# Patient Record
Sex: FEMALE | Race: WHITE | NOT HISPANIC OR LATINO | Employment: FULL TIME | URBAN - NONMETROPOLITAN AREA
[De-identification: names, ages, dates, MRNs, and addresses within clinical notes are randomized per-mention and may not be internally consistent; named-entity substitution may affect disease eponyms.]

---

## 2020-11-19 LAB — POC BETA-HCG (QUAL): NEGATIVE

## 2021-03-23 LAB
BILIRUB SERPL-MCNC: NORMAL MG/DL
BLOOD URINE, POC: NORMAL
CLARITY, POC UA: CLEAR
COLOR, POC UA: YELLOW
GLUCOSE UR QL STRIP: NEGATIVE
KETONES UR QL STRIP: NEGATIVE
LEUKOCYTE EST, POC UA: NORMAL
NITRITE, POC UA: NEGATIVE
PH, POC UA: 6.5
POC BETA-HCG (QUAL): POSITIVE
PROTEIN, POC: NORMAL
SPECIFIC GRAVITY, POC UA: 1.02
UROBILINOGEN, POC UA: NORMAL

## 2021-03-30 LAB
CLARITY, POC UA: CLEAR
COLOR, POC UA: YELLOW
GLUCOSE UR QL STRIP: NEGATIVE
LEUKOCYTE EST, POC UA: NEGATIVE
NITRITE, POC UA: NEGATIVE
PROTEIN, POC: NEGATIVE

## 2021-04-05 ENCOUNTER — HISTORICAL (OUTPATIENT)
Dept: ADMINISTRATIVE | Facility: HOSPITAL | Age: 21
End: 2021-04-05

## 2021-04-05 LAB — B-HCG FREE SERPL-ACNC: 21.95 MIU/ML

## 2022-04-10 ENCOUNTER — HISTORICAL (OUTPATIENT)
Dept: ADMINISTRATIVE | Facility: HOSPITAL | Age: 22
End: 2022-04-10

## 2022-04-25 VITALS
SYSTOLIC BLOOD PRESSURE: 116 MMHG | HEIGHT: 63 IN | DIASTOLIC BLOOD PRESSURE: 60 MMHG | WEIGHT: 145.5 LBS | BODY MASS INDEX: 25.78 KG/M2 | OXYGEN SATURATION: 97 %

## 2022-04-26 LAB
ERYTHROCYTE [DISTWIDTH] IN BLOOD BY AUTOMATED COUNT: 11.5 % (ref 11–14.5)
HCT VFR BLD AUTO: 37.4 % (ref 36–48)
HGB BLD-MCNC: 13.1 G/DL (ref 11.8–16)
MCH RBC QN AUTO: 32.8 PG (ref 27–34)
MCHC RBC AUTO-ENTMCNC: 35 G/DL (ref 31–37)
MCV RBC AUTO: 93.5 FL (ref 79–99)
PLATELET # BLD AUTO: 257 10*3/UL (ref 140–371)
PMV BLD AUTO: 10.8 FL (ref 9.4–12.4)
RBC # BLD AUTO: 4 10*6/UL (ref 4–5.1)
WBC # SPEC AUTO: 9.5 10*3/UL (ref 4–11.5)

## 2022-05-02 ENCOUNTER — HISTORICAL (OUTPATIENT)
Dept: ADMINISTRATIVE | Facility: HOSPITAL | Age: 22
End: 2022-05-02

## 2022-05-03 NOTE — HISTORICAL [facility] CERNER
This is a historical note converted from Ernestine. Formatting and pictures may have been removed.  Please reference Ernestine for original formatting and attached multimedia. Chief Complaint  rescan  History of Present Illness  19yo WF  in today for Rescan. Reports to not have had any bleeding after Cytotec, just cramping.  Gynecological History  Last Menstrual Period: 20  Date of Last Pap Smear: 20  Menstrual Status Intake: Due to pregnancy  Age of Menarche: 10  STIs/STDs: No  Abnormal Pap: No  Current Birth Control Method: Implant  HPV Vaccine: No  Flow: Heavy  Dyspareunia: No  Duration of Flow: 4-6  Postcoital Bleeding: No  Frequency of Cycle: 28-32  Dysuria: No  Additional GYN Information: Last PAP 11/3/20 WNL  Discharge OB: None  Breast CA Hx: No  Sexual Problems OB: none  Urinary Incontinence: none  Sexually Active: Yes  Review of Systems  General/Constitutional:  Fever?denies?.?  Skin:  Rash?denies.  Gastrointestinal:  Abdominal pain?denies?. Constipation?denies?. Diarrhea?denies?. Heartburn?denies?. Nausea?denies?. Vomiting?denies?  Genitourinary:  Painful urination?denies.  Gynecologic:  Vaginal bleeding?denies?. Vaginal discharge?Normal. Leaking amniotic fluid?denies. ?Contractions?denies?  Psychiatric:  Depressed mood?denies. Suicidal thoughts?denies.?  Physical Exam  Vitals & Measurements  T:?36.4? ?C (Temporal Artery)? BP:?116/60?  HT:?160.00?cm? WT:?66.000?kg? BMI:?25.78?  General Exam:  ?  General Appearance:?alert, in no acute distress, normal, well nourished.  Psych:  Orientation:?time, place, person.  Mood/Affect:?Normal?  Abdomen:  ?-?Soft.?Non-tender. ?No rebound tenderness or guardingNo masses. ? Liver?normal. ?Spleen?normal. ?No hernia palpable.  Pelvis:?  ?- deferred  ?  TVUS:  Due to poor visualization from 4.5cm right ovarian cyst, unable to visualized ES  No defined GS.?  Free fluid:  Ovaries: Right ovary cyst 4.5cm  Assessment/Plan  1.?Incomplete ?O03.4  Reviewed U/S  findings  Repeat serial HCG today, and 48hours  Pelvic U/S  Will call patient with results and discuss treatment plan,?possible D&C  Infection precautions given   OB History  Pregnancy History???(0,0,0,0)?? ??  ?  ??No previous pregnancies history have been recorded  Problem List/Past Medical History  Ongoing  ADHD - Attention deficit disorder with hyperactivity  Anxiety  Depression  Incomplete   Historical  No qualifying data  Procedure/Surgical History  nexplanon inserted ()  Tonsillectomy   Medications  No active medications  Allergies  No Known Medication Allergies  Social History  Abuse/Neglect  No, 2020  Alcohol  Never, 2020  Sexual  Sexually active: Yes. Gender Identity Identifies as female., 2020  Substance Use  Never, 2020  Tobacco  Former smoker, quit more than 30 days ago, No, 2021  Marital Status  Single  Family History  Cancer: Mother.  Diabetes mellitus type 2: Father.  Heart disease: Father.  Immunizations  Vaccine Date Status   influenza virus vaccine, inactivated 12/15/2020 Given   tetanus/diphtheria/pertussis, acel(Tdap) 2011 Recorded   human papillomavirus vaccine 2011 Recorded   human papillomavirus vaccine 2010 Recorded   varicella virus vaccine 2010 Recorded   tetanus/diphtheria/pertussis, acel(Tdap) 2010 Recorded   meningococcal conjugate vaccine 2010 Recorded   human papillomavirus vaccine 2010 Recorded   poliovirus vaccine, inactivated 2003 Recorded   measles/mumps/rubella virus vaccine 2003 Recorded   varicella virus vaccine 2000 Recorded   poliovirus vaccine, inactivated 2000 Recorded   measles/mumps/rubella virus vaccine 2000 Recorded   diphtheria/pertussis, acel/tetanus ped 2000 Recorded   hepatitis B pediatric vaccine 1999 Recorded   poliovirus vaccine, inactivated 1999 Recorded   poliovirus vaccine, inactivated 1999 Recorded   hepatitis B  pediatric vaccine 1999 Recorded   diphtheria/pertussis, acel/tetanus ped 1999 Recorded   hepatitis B pediatric vaccine 1999 Recorded   Health Maintenance  Health Maintenance  ???Pending?(in the next year)  ??? ??OverDue  ??? ? ? ?Smoking Cessation due??01/01/21??Variable frequency  ??? ? ? ?Alcohol Misuse Screening due??01/02/21??and every 1??year(s)  ??? ??Due?  ??? ? ? ?ADL Screening due??04/05/21??and every 1??year(s)  ??? ??Due In Future?  ??? ? ? ?Tetanus Vaccine not due until??05/19/21??and every 10??year(s)  ??? ? ? ?Influenza Vaccine not due until??10/01/21??and every 1??day(s)  ??? ? ? ?Obesity Screening not due until??01/01/22??and every 1??year(s)  ???Satisfied?(in the past 1 year)  ??? ??Satisfied?  ??? ? ? ?Blood Pressure Screening on??04/05/21.??Satisfied by Patricia Barrios LPN  ??? ? ? ?Body Mass Index Check on??04/05/21.??Satisfied by Patricia Barrios LPN  ??? ? ? ?Cervical Cancer Screening on??11/03/20.??Satisfied by Scooter Palomino MD  ??? ? ? ?Influenza Vaccine on??12/15/20.??Satisfied by Anabelle DAVIESP-CAlannah  ??? ? ? ?Obesity Screening on??04/05/21.??Satisfied by Patricia Barrios LPN  ?

## 2022-07-12 ENCOUNTER — HISTORICAL (OUTPATIENT)
Dept: ADMINISTRATIVE | Facility: HOSPITAL | Age: 22
End: 2022-07-12

## 2022-09-08 ENCOUNTER — HISTORICAL (OUTPATIENT)
Dept: ADMINISTRATIVE | Facility: HOSPITAL | Age: 22
End: 2022-09-08

## 2022-09-11 ENCOUNTER — HISTORICAL (OUTPATIENT)
Dept: ADMINISTRATIVE | Facility: HOSPITAL | Age: 22
End: 2022-09-11

## 2022-09-19 ENCOUNTER — HISTORICAL (OUTPATIENT)
Dept: ADMINISTRATIVE | Facility: HOSPITAL | Age: 22
End: 2022-09-19

## 2022-09-21 ENCOUNTER — HISTORICAL (OUTPATIENT)
Dept: ADMINISTRATIVE | Facility: HOSPITAL | Age: 22
End: 2022-09-21

## 2023-06-15 NOTE — PROGRESS NOTES
Chief Complaint:  Well Woman    History of Present Illness:  Katerin Chen is a 23 y.o. year old  presents for her well woman. Currently using nothing for birth control. Patient has monthly cycles with moderate flow lasting 4-5 days. Denies any irregular menstrual bleeding.     LMP: 23  Frequency: q month     Cycle Length: 4-5 days   Flow: moderate-heavy   Intermenstrual Bleeding: No  Postcoital Bleeding: No  Dysmenorrhea: No  Sexually Active: Yes    Dyspareunia: No  Contraception: None   H/o STI: CZ, TV  Last pap: 22 WNL   H/o Abnormal Pap: No   Colposcopy: No   Gardasil: 3/3  MMG n/a      Review of Systems:  General/Constitutional: Chills denies. Fatigue/weakness denies. Fever denies. Night sweats denies. Hot flashes denies    Respiratory: Cough denies. Hemoptysis denies. SOB denies. Sputum production denies. Wheezing denies .   Cardiovascular: Chest pain denies . Dizziness denies. Palpitations denies. Swelling in hands/feet denies    Gastrointestinal: Abdominal pain denies. Blood in stool denies. Constipation denies. Diarrhea denies. Heartburn denies. Nausea denies. Vomiting denies    Genitourinary: Incontinence denies. Blood in urine denies. Frequent urination denies. Painful urination denies. Urinary urgency denies. Nocturia denies    Gynecologic: Irregular menses denies. Heavy bleeding denies. Painful menses denies. Vaginal discharge denies. Vaginal odor denies. Vaginal itching denies. Vaginal lesion denies. Pelvic pain denies. Decreased libido denies. Vulvar lesion denies. Prolapse of genital organs denies. Painful intercourse denies. Postcoital bleeding denies    Psychiatric: Depression denies. Anxiety denies     OB History    Para Term  AB Living   2 1 1 0 1 1   SAB IAB Ectopic Multiple Live Births   1 0 0 0 1      # 1 - Date: 2021, Sex: None, Weight: None, GA: 7w0d, Delivery: Spontaneous , Apgar1: None, Apgar5: None, Living: Fetal Demise, Birth Comments:  "None    # 2 - Date: 11/16/22, Sex: Female, Weight: 3.345 kg (7 lb 6 oz), GA: 38w4d, Delivery: Vaginal, Spontaneous, Apgar1: None, Apgar5: None, Living: Living, Birth Comments: None      Past Medical History:   Diagnosis Date    ADHD     Mental disorder      No current outpatient medications on file.    Review of patient's allergies indicates:  No Known Allergies    Past Surgical History:   Procedure Laterality Date    TONSILLECTOMY Bilateral      Family History   Problem Relation Age of Onset    Lung cancer Maternal Grandfather     Diabetes Father     Heart disease Father      Social History     Socioeconomic History    Marital status: Single   Tobacco Use    Smoking status: Every Day     Types: Vaping with nicotine    Smokeless tobacco: Never   Substance and Sexual Activity    Alcohol use: Not Currently    Drug use: Never    Sexual activity: Yes     Partners: Male     Birth control/protection: None       Physical Exam:  /66 (BP Location: Right arm)   Temp 97 °F (36.1 °C)   Ht 5' 2.99" (1.6 m)   Wt 70.9 kg (156 lb 6.4 oz)   LMP 06/08/2023   BMI 27.71 kg/m²     Chaperone: present.     General appearance: healthy, well-nourished and well-developed     Psychiatric: Orientation to time, place and person. Normal mood and affect and active, alert     Skin: Appearance: no rashes or lesions.     Neck:   Neck: supple, FROM, trachea midline. and no masses   Thyroid: no enlargement or nodules and non-tender.       Cardiovascular:   Auscultation: RRR and no murmur.   Peripheral Vascular: no varicosities, LLE edema, RLE edema, calf tenderness, and palpable cord and pedal pulses intact.     Lungs:   Respiratory effort: no intercostal retractions or accessory muscle usage.   Auscultation: no wheezing, rales/crackles, or rhonchi and clear to auscultation.     Breast:   Inspection/Palpation: no tenderness, discrete/distinct masses, skin changes, or abnormal secretions. Nipple appearance normal.     Abdomen: "   Auscultation/Inspection/Palpation: no hepatomegaly, splenomegaly, masses, tenderness or CVA tenderness and soft, non-distended bowel sounds preset.    Hernia: no palpable hernias.     Female Genitalia:    Vulva: no masses, tenderness or lesions    Bladder/Urethra: no urethral discharge or mass, normal meatus, bladder non-distended.    Vagina: no tenderness, erythema, cystocele, rectocele, abnormal vaginal discharge or vesicle(s) or ulcers    Cervix: no discharge, no cervical lacerations noted or motion tenderness and grossly normal    Uterus: normal size and shape and midline, non-tender, and no uterine prolapse.    Adnexa/Parametria: no parametrial tenderness or mass, no adnexal tenderness or ovarian mass.     Lymph Nodes:   Palpation: non tender submandibular nodes, axillary nodes, or inguinal nodes.     Rectal Exam:   Rectum: normal perianal skin.       Assessment/Plan:  1. Well woman exam  Pap gc/cz/tv  Advised patient if she notices any changes to her breasts, including a lump, mass, dimpling, discharge, rash or tenderness, to should contact us immediately   Healthy diet, exercise   Multivitamin   Seat belt   Sunscreen use   Safe sex/ STI education   Contraception evaluation: nothing   Gardasil evaluation: 3/3    2. Encounter for contraceptive management, unspecified type  Discussed with patient contraceptive options including natural family planning, barrier, oral contraceptives, patch, NuvaRing, Depo-Provera, Nexplanon, IUDs, abstinence.       Safe sex education       Discussed with patient that birth control options discussed above do not protect against STDs.     Patient declines

## 2023-06-16 ENCOUNTER — OFFICE VISIT (OUTPATIENT)
Dept: OBSTETRICS AND GYNECOLOGY | Facility: CLINIC | Age: 23
End: 2023-06-16
Payer: MEDICAID

## 2023-06-16 VITALS
SYSTOLIC BLOOD PRESSURE: 108 MMHG | DIASTOLIC BLOOD PRESSURE: 66 MMHG | BODY MASS INDEX: 27.71 KG/M2 | TEMPERATURE: 97 F | HEIGHT: 63 IN | WEIGHT: 156.38 LBS

## 2023-06-16 DIAGNOSIS — Z12.4 SCREENING FOR MALIGNANT NEOPLASM OF THE CERVIX: ICD-10-CM

## 2023-06-16 DIAGNOSIS — Z01.419 WELL WOMAN EXAM: Primary | ICD-10-CM

## 2023-06-16 DIAGNOSIS — Z30.9 ENCOUNTER FOR CONTRACEPTIVE MANAGEMENT, UNSPECIFIED TYPE: ICD-10-CM

## 2023-06-16 DIAGNOSIS — Z11.3 SCREENING EXAMINATION FOR VENEREAL DISEASE: ICD-10-CM

## 2023-06-16 PROCEDURE — 1160F PR REVIEW ALL MEDS BY PRESCRIBER/CLIN PHARMACIST DOCUMENTED: ICD-10-PCS | Mod: CPTII,,, | Performed by: OBSTETRICS & GYNECOLOGY

## 2023-06-16 PROCEDURE — 1159F MED LIST DOCD IN RCRD: CPT | Mod: CPTII,,, | Performed by: OBSTETRICS & GYNECOLOGY

## 2023-06-16 PROCEDURE — 3074F PR MOST RECENT SYSTOLIC BLOOD PRESSURE < 130 MM HG: ICD-10-PCS | Mod: CPTII,,, | Performed by: OBSTETRICS & GYNECOLOGY

## 2023-06-16 PROCEDURE — 1160F RVW MEDS BY RX/DR IN RCRD: CPT | Mod: CPTII,,, | Performed by: OBSTETRICS & GYNECOLOGY

## 2023-06-16 PROCEDURE — 3074F SYST BP LT 130 MM HG: CPT | Mod: CPTII,,, | Performed by: OBSTETRICS & GYNECOLOGY

## 2023-06-16 PROCEDURE — 3078F PR MOST RECENT DIASTOLIC BLOOD PRESSURE < 80 MM HG: ICD-10-PCS | Mod: CPTII,,, | Performed by: OBSTETRICS & GYNECOLOGY

## 2023-06-16 PROCEDURE — 99395 PREV VISIT EST AGE 18-39: CPT | Mod: ,,, | Performed by: OBSTETRICS & GYNECOLOGY

## 2023-06-16 PROCEDURE — 1159F PR MEDICATION LIST DOCUMENTED IN MEDICAL RECORD: ICD-10-PCS | Mod: CPTII,,, | Performed by: OBSTETRICS & GYNECOLOGY

## 2023-06-16 PROCEDURE — 99395 PR PREVENTIVE VISIT,EST,18-39: ICD-10-PCS | Mod: ,,, | Performed by: OBSTETRICS & GYNECOLOGY

## 2023-06-16 PROCEDURE — 3008F PR BODY MASS INDEX (BMI) DOCUMENTED: ICD-10-PCS | Mod: CPTII,,, | Performed by: OBSTETRICS & GYNECOLOGY

## 2023-06-16 PROCEDURE — 3008F BODY MASS INDEX DOCD: CPT | Mod: CPTII,,, | Performed by: OBSTETRICS & GYNECOLOGY

## 2023-06-16 PROCEDURE — 3078F DIAST BP <80 MM HG: CPT | Mod: CPTII,,, | Performed by: OBSTETRICS & GYNECOLOGY

## 2023-06-21 ENCOUNTER — TELEPHONE (OUTPATIENT)
Dept: OBSTETRICS AND GYNECOLOGY | Facility: CLINIC | Age: 23
End: 2023-06-21
Payer: MEDICAID

## 2023-06-21 LAB — PSYCHE PATHOLOGY RESULT: NORMAL

## 2023-06-21 NOTE — TELEPHONE ENCOUNTER
----- Message from Marah Joiner MD sent at 6/21/2023  1:18 PM CDT -----  +TV Call, STI information. Can come in for education if desires.     Flagyl 500mg PO BID x7  Pelvic rest     Partner treatment if desires.      Make appointment for 1 mo JUAN.

## 2023-06-21 NOTE — PROGRESS NOTES
+TV Call, STI information. Can come in for education if desires.     Flagyl 500mg PO BID x7  Pelvic rest     Partner treatment if desires.      Make appointment for 1 mo JUAN.

## 2023-07-18 NOTE — PROGRESS NOTES
Chief Complaint:  follow up     History of Present Illness:  Katerin Chen is a 23 y.o.  who presents with previous diagnosis of trichomonias for a test of cure. She completed her antibiotics with no problems. She denies vaginal itching, odor, or discharge. That partner also completed the medication for the infection.    C/o five day history of nausea and vomiting. Denies feeling light headed or dizzy.  LMP 23.     Desires to discuss contra care mngt. No current contraception.       LMP: 23  Frequency: q month     Cycle Length: 4-5 days   Flow: moderate-heavy   Intermenstrual Bleeding: No  Postcoital Bleeding: No  Dysmenorrhea: Yes, mild  Sexually Active: Yes    Dyspareunia: No  Contraception: None   H/o STI: CZ, TV  Last pap: 23, nml; w/ +tv, neg gc/cz   H/o Abnormal Pap: No   Colposcopy: No   Gardasil: 3/3  MMG n/a       Review of Systems:  General/Constitutional: Chills denies. Fatigue/weakness denies. Fever denies. Night sweats denies. Hot flashes denies  Gastrointestinal: Abdominal pain denies. Blood in stool denies. Constipation denies. Diarrhea denies. Heartburn denies. Nausea admits. Vomiting admits   Genitourinary: Incontinence denies. Blood in urine denies. Frequent urination denies. Urgency denies. Painful urination denies. Nocturia denies   Gynecologic: Irregular menses denies. Heavy bleeding denies. Painful menses denies. Vaginal discharge denies. Vaginal odor denies. Vaginal itching/Irritation denies. Vaginal lesion denies.  Pelvic pain denies. Decreased libido denies. Vulvar lesion denies. Prolapse of genital organs denies. Painful intercourse denies. Postcoital bleeding denies   Psychiatric: Mood lability denies. Depressed mood denies. Suicidal thoughts denies. Anxiety denies. Overwhelmed denies. Appetite normal. Energy level normal     OB History    Para Term  AB Living   2 1 1 0 1 1   SAB IAB Ectopic Multiple Live Births   1 0 0 0 1      # 1 - Date: 2021,  "Sex: None, Weight: None, GA: 7w0d, Delivery: Spontaneous , Apgar1: None, Apgar5: None, Living: Fetal Demise, Birth Comments: None    # 2 - Date: 22, Sex: Female, Weight: 3.345 kg (7 lb 6 oz), GA: 38w4d, Delivery: Vaginal, Spontaneous, Apgar1: None, Apgar5: None, Living: Living, Birth Comments: None      Past Medical History:   Diagnosis Date    ADHD     Anxiety disorder, unspecified     Mental disorder        No current outpatient medications on file.      Physical Exam:  /60 (BP Location: Right arm, Patient Position: Sitting, BP Method: Medium (Manual))   Temp 98.6 °F (37 °C) (Temporal)   Ht 5' 3" (1.6 m)   Wt 69.2 kg (152 lb 9.6 oz)   LMP 2023 (Exact Date)   Breastfeeding No   BMI 27.03 kg/m²     Chaperone present.       Constitutional: General appearance: healthy, well-nourished and well-developed   Psychiatric: Orientation to time, place and person. Normal mood and affect and active, alert   Abdomen: Auscultation/Inspection/Palpation: No tenderness or masses. Soft, nondistended    Female Genitalia:      Vulva: no masses, atrophy or lesions      Bladder/Urethra: no urethral discharge or mass, normal meatus, bladder non-distended.      Vagina: no tenderness, erythema, cystocele, rectocele, abnormal vaginal discharge, or vesicle(s) or ulcers                   Cervix: no discharge or cervical motion tenderness and grossly normal      Uterus: normal size and shape and midline, non-tender, and no uterine prolapse.      Adnexa/Parametria: no parametrial tenderness or mass, no adnexal tenderness or ovarian mass.       Assessment/Plan:  1. Trichomoniasis  23 +trich; treated w/ flagyl 500 mg BID x7 days  JUAN today    Counseled on safe sex practices including barrier methods such as condoms to protect against STIs.     2. Nausea/vomiting  Educated  UPT negative  F/u with PCP    3. Contra care mngt  Discussed with patient contraceptive options including natural family planning, barrier, " oral contraceptives, patch, NuvaRing, Depo-Provera, Nexplanon, IUDs, abstinence.       Safe sex education       Discussed with patient that birth control options discussed above do not protect against STDs.     Patient  desires DMPA, tolerated well

## 2023-07-19 ENCOUNTER — OFFICE VISIT (OUTPATIENT)
Dept: OBSTETRICS AND GYNECOLOGY | Facility: CLINIC | Age: 23
End: 2023-07-19
Payer: MEDICAID

## 2023-07-19 VITALS
WEIGHT: 152.63 LBS | SYSTOLIC BLOOD PRESSURE: 102 MMHG | TEMPERATURE: 99 F | DIASTOLIC BLOOD PRESSURE: 60 MMHG | HEIGHT: 63 IN | BODY MASS INDEX: 27.04 KG/M2

## 2023-07-19 DIAGNOSIS — Z30.013 INITIATION OF DEPO PROVERA: ICD-10-CM

## 2023-07-19 DIAGNOSIS — A59.9 TRICHOMONIASIS: Primary | ICD-10-CM

## 2023-07-19 DIAGNOSIS — R11.2 NAUSEA AND VOMITING, UNSPECIFIED VOMITING TYPE: ICD-10-CM

## 2023-07-19 LAB
B-HCG UR QL: NEGATIVE
CTP QC/QA: YES

## 2023-07-19 PROCEDURE — 1159F PR MEDICATION LIST DOCUMENTED IN MEDICAL RECORD: ICD-10-PCS | Mod: CPTII,,, | Performed by: OBSTETRICS & GYNECOLOGY

## 2023-07-19 PROCEDURE — 99214 OFFICE O/P EST MOD 30 MIN: CPT | Mod: ,,, | Performed by: OBSTETRICS & GYNECOLOGY

## 2023-07-19 PROCEDURE — 81025 URINE PREGNANCY TEST: CPT | Mod: ,,, | Performed by: OBSTETRICS & GYNECOLOGY

## 2023-07-19 PROCEDURE — 81025 POCT URINE PREGNANCY: ICD-10-PCS | Mod: ,,, | Performed by: OBSTETRICS & GYNECOLOGY

## 2023-07-19 PROCEDURE — 3078F PR MOST RECENT DIASTOLIC BLOOD PRESSURE < 80 MM HG: ICD-10-PCS | Mod: CPTII,,, | Performed by: OBSTETRICS & GYNECOLOGY

## 2023-07-19 PROCEDURE — 3008F PR BODY MASS INDEX (BMI) DOCUMENTED: ICD-10-PCS | Mod: CPTII,,, | Performed by: OBSTETRICS & GYNECOLOGY

## 2023-07-19 PROCEDURE — 99214 PR OFFICE/OUTPT VISIT, EST, LEVL IV, 30-39 MIN: ICD-10-PCS | Mod: ,,, | Performed by: OBSTETRICS & GYNECOLOGY

## 2023-07-19 PROCEDURE — 3008F BODY MASS INDEX DOCD: CPT | Mod: CPTII,,, | Performed by: OBSTETRICS & GYNECOLOGY

## 2023-07-19 PROCEDURE — 3074F PR MOST RECENT SYSTOLIC BLOOD PRESSURE < 130 MM HG: ICD-10-PCS | Mod: CPTII,,, | Performed by: OBSTETRICS & GYNECOLOGY

## 2023-07-19 PROCEDURE — 87661 TRICHOMONAS VAGINALIS AMPLIF: CPT | Performed by: OBSTETRICS & GYNECOLOGY

## 2023-07-19 PROCEDURE — 3078F DIAST BP <80 MM HG: CPT | Mod: CPTII,,, | Performed by: OBSTETRICS & GYNECOLOGY

## 2023-07-19 PROCEDURE — 1159F MED LIST DOCD IN RCRD: CPT | Mod: CPTII,,, | Performed by: OBSTETRICS & GYNECOLOGY

## 2023-07-19 PROCEDURE — 3074F SYST BP LT 130 MM HG: CPT | Mod: CPTII,,, | Performed by: OBSTETRICS & GYNECOLOGY

## 2023-07-19 RX ORDER — MEDROXYPROGESTERONE ACETATE 150 MG/ML
150 INJECTION, SUSPENSION INTRAMUSCULAR
Status: COMPLETED | OUTPATIENT
Start: 2023-07-19 | End: 2023-07-19

## 2023-07-19 RX ADMIN — MEDROXYPROGESTERONE ACETATE 150 MG: 150 INJECTION, SUSPENSION INTRAMUSCULAR at 11:07

## 2023-07-20 LAB
SPECIMEN SOURCE: NORMAL
T VAGINALIS RRNA SPEC QL NAA+PROBE: NEGATIVE

## 2024-06-19 NOTE — PROGRESS NOTES
Chief Complaint:   Well Woman     History of Present Illness:  Katerin Chen is a 24 y.o. year old  presents c/o missed cycle. LMP 3/27/24. Prior to this, having regular monthly cycles. Sexually active. Denies vaginal bleeding, discharge or pelvic pain.       Gyn History:  Menstrual History   Cycle: Yes  Menarche Age: 10 years  Flow Duration: 5  Flow: Normal  Interval: 28  Intermenstrual Bleeding: No  Dysmenorrhea: Yes  Dysmenorrhea Severity : Moderate  [place]  Sexually Active: Yes  Sexual Orientation: heterosexual  Postcoital Bleeding: No  Dyspareunia: No  STI History: Yes  STI Type: Chlamydia, Trichomonas  Contraception: No  Menopause  Menopause Age: 1 years  Breast History  Last Breast Imaging Date: No  History of Breast Biopsy: No  Pap History   Last pap date: 23  Result: Normal  History of Abnormal Pap: No  HPV Vaccine Completed: Yes (3/3)      Review of Systems:  General/Constitutional: Chills denies. Fatigue/weakness denies. Fever denies. Night sweats denies. Hot flashes denies.  Gastrointestinal: Abdominal pain denies. Blood in stool denies. Constipation denies. Diarrhea denies. Heartburn denies. Nausea denies. Vomiting denies.   Genitourinary: Incontinence denies. Blood in urine denies. Frequent urination denies. Urgency denies. Painful urination denies. Nocturia denies.  Gynecologic: Irregular menses denies. Heavy bleeding denies. Painful menses denies. Vaginal discharge denies. Vaginal odor denies. Vaginal itching/Irritation denies. Vaginal lesion denies.  Pelvic pain denies. Decreased libido denies. Vulvar lesion denies. Prolapse of genital organs denies. Painful intercourse denies. Postcoital bleeding denies.   Psychiatric: Mood lability denies. Depressed mood denies. Suicidal thoughts denies. Anxiety denies. Overwhelmed denies. Appetite normal. Energy level normal.    OB History    Para Term  AB Living   2 1 1 0 1 1   SAB IAB Ectopic Multiple Live Births   1 0 0 0 1  "     # 1 - Date: 2021, Sex: None, Weight: None, GA: 7w0d, Type: Spontaneous , Apgar1: None, Apgar5: None, Living: Fetal Demise, Birth Comments: None    # 2 - Date: 22, Sex: Female, Weight: 3.345 kg (7 lb 6 oz), GA: 38w4d, Type: Vaginal, Spontaneous, Apgar1: None, Apgar5: None, Living: Living, Birth Comments: None      Past Medical History:   Diagnosis Date    ADHD     Anxiety disorder, unspecified     Mental disorder        Past Surgical History:   Procedure Laterality Date    TONSILLECTOMY Bilateral         No current outpatient medications on file.    Social History     Socioeconomic History    Marital status: Single   Tobacco Use    Smoking status: Former     Types: Vaping with nicotine     Start date:     Smokeless tobacco: Never   Substance and Sexual Activity    Alcohol use: Not Currently    Drug use: Never    Sexual activity: Yes     Partners: Male     Birth control/protection: None     Comment: STI: +TV, +CT         Physical Exam:  /66   Temp 97.5 °F (36.4 °C)   Ht 5' 3" (1.6 m)   Wt 76.6 kg (168 lb 12.8 oz)   LMP 2024   BMI 29.90 kg/m²         Constitutional: General appearance: healthy, well-nourished and well-developed  Psychiatric:  Orientation to time, place and person. Normal mood and affect and active, alert       Assessment/Plan:  1. Oligomenorrhea  UPT+   Educated   No HILTON   PNV, folic acid   Pain/fever/bleeding prec     Based on LMP, GA today 12w1d with GUERRERO of 2025  Follow up new ob visit in 2 weeks  Pap smear on RTC, declines today        This note was transcribed by Ofe Chavarria MA. There may be transcription errors as a result, however minimal. Effort has been made to ensure accuracy of transcription, but any obvious errors or omissions should be clarified with the author of the document.       "

## 2024-06-20 ENCOUNTER — OFFICE VISIT (OUTPATIENT)
Dept: OBSTETRICS AND GYNECOLOGY | Facility: CLINIC | Age: 24
End: 2024-06-20
Payer: MEDICAID

## 2024-06-20 VITALS
TEMPERATURE: 98 F | HEIGHT: 63 IN | DIASTOLIC BLOOD PRESSURE: 66 MMHG | SYSTOLIC BLOOD PRESSURE: 108 MMHG | WEIGHT: 168.81 LBS | BODY MASS INDEX: 29.91 KG/M2

## 2024-06-20 DIAGNOSIS — N91.5 OLIGOMENORRHEA, UNSPECIFIED TYPE: Primary | ICD-10-CM

## 2024-06-20 LAB
B-HCG UR QL: POSITIVE
CTP QC/QA: YES

## 2024-06-20 PROCEDURE — 99213 OFFICE O/P EST LOW 20 MIN: CPT | Mod: ,,, | Performed by: OBSTETRICS & GYNECOLOGY

## 2024-06-20 PROCEDURE — 1159F MED LIST DOCD IN RCRD: CPT | Mod: CPTII,,, | Performed by: OBSTETRICS & GYNECOLOGY

## 2024-06-20 PROCEDURE — 3074F SYST BP LT 130 MM HG: CPT | Mod: CPTII,,, | Performed by: OBSTETRICS & GYNECOLOGY

## 2024-06-20 PROCEDURE — 3078F DIAST BP <80 MM HG: CPT | Mod: CPTII,,, | Performed by: OBSTETRICS & GYNECOLOGY

## 2024-06-20 PROCEDURE — 3008F BODY MASS INDEX DOCD: CPT | Mod: CPTII,,, | Performed by: OBSTETRICS & GYNECOLOGY

## 2024-06-20 PROCEDURE — 81025 URINE PREGNANCY TEST: CPT | Mod: ,,, | Performed by: OBSTETRICS & GYNECOLOGY

## 2024-07-08 NOTE — PROGRESS NOTES
Chief Complaint:  Initial Prenatal Visit    History of Present Illness:  Katerin Chen is a 24 y.o. year old  presents for her new ob at 15w0d by LMP 3/27/24. She is doing well.       Gyn History:  Menstrual History   Cycle: Yes (LMP: 3/27/24)  Menarche Age: 10 years  Flow Duration:  (3-7)  Flow: Normal  Interval:  (Irregular)  Intermenstrual Bleeding: Yes  Dysmenorrhea: Yes  Dysmenorrhea Severity : Mild  [place]  Sexually Active: Yes  Sexual Orientation: heterosexual  Postcoital Bleeding: No  Dyspareunia: No  STI History: Yes  STI Type: Chlamydia, Trichomonas  Contraception: No  Menopause  Menopause Age: 1 years  Breast History  Last Breast Imaging Date: No  History of Breast Biopsy: No  Pap History   Last pap date: 23  Result: (!) Abnormal (NIL w/ +Trich, -Gc/Ct)  History of Abnormal Pap: No  HPV Vaccine Completed: Yes (3/3)      Review of Systems:  General/Constitutional: Chills denies. Fatigue/weakness denies. Fever denies. Night sweats denies. Hot flashes denies.   Respiratory: Cough denies. Hemoptysis denies. SOB denies. Sputum production denies. Wheezing denies.   Cardiovascular: Chest pain denies. Dizziness denies. Palpitations denies. Swelling in hands/feet denies.    Gastrointestinal: Abdominal pain denies. Blood in stool denies. Constipation denies. Diarrhea denies. Heartburn denies. Nausea denies. Vomiting denies.   Genitourinary: Incontinence denies. Blood in urine denies. Frequent urination denies. Painful urination denies.  Urinary urgency denies.  Nocturia denies.   Gynecologic: Irregular menses denies. Heavy bleeding denies. Painful menses denies. Vaginal discharge denies. Vaginal odor denies. Vaginal itching denies. Vaginal lesion denies.  Pelvic pain denies. Decreased libido denies. Vulvar lesion denies. Prolapse of genital organs denies. Painful intercourse denies. Postcoital bleeding denies.   Psychiatric: Depression denies. Anxiety denies.     OB History    Para Term   AB Living   3 1 1 0 1 1   SAB IAB Ectopic Multiple Live Births   1 0 0 0 1      # 1 - Date: 2021, Sex: None, Weight: None, GA: 7w0d, Type: Spontaneous , Apgar1: None, Apgar5: None, Living: Fetal Demise, Birth Comments: None    # 2 - Date: 22, Sex: Female, Weight: 3.345 kg (7 lb 6 oz), GA: 38w4d, Type: Vaginal, Spontaneous, Apgar1: None, Apgar5: None, Living: Living, Birth Comments: None    # 3 - Date: None, Sex: None, Weight: None, GA: None, Type: None, Apgar1: None, Apgar5: None, Living: None, Birth Comments: None        Past Medical History:   Diagnosis Date    ADHD     Anxiety disorder, unspecified     Mental disorder        Current Outpatient Medications:     PNV no.153/FA/om3/dha/epa/fish (PRENATAL GUMMIES ORAL), Take 1 Units by mouth Daily., Disp: , Rfl:     aspirin (ECOTRIN) 81 MG EC tablet, Take 1 tablet (81 mg total) by mouth once daily., Disp: 90 tablet, Rfl: 1    PNV,calcium 72/iron/folic acid (PRENATAL VITAMIN) Tab, Take 1 tablet by mouth once daily., Disp: 30 tablet, Rfl: 11    Review of patient's allergies indicates:  No Known Allergies    Past Surgical History:   Procedure Laterality Date    TONSILLECTOMY Bilateral      Family History   Problem Relation Name Age of Onset    Lung cancer Maternal Grandfather      Diabetes Father      Heart disease Father      Breast cancer Neg Hx      Colon cancer Neg Hx      Ovarian cancer Neg Hx      Uterine cancer Neg Hx      Cervical cancer Neg Hx       Social History     Socioeconomic History    Marital status: Single   Tobacco Use    Smoking status: Former     Types: Vaping with nicotine     Start date:      Quit date: 2024     Years since quittin.1     Passive exposure: Never    Smokeless tobacco: Never   Substance and Sexual Activity    Alcohol use: Not Currently    Drug use: Never    Sexual activity: Yes     Partners: Male     Birth control/protection: None     Comment: STI: +TV, +CT       Physical Exam:  /72 (BP  "Location: Left arm, Patient Position: Sitting, BP Method: Medium (Manual))   Temp 97 °F (36.1 °C) (Temporal)   Ht 5' 3.5" (1.613 m)   Wt 78 kg (172 lb)   LMP 2024   Breastfeeding No   BMI 29.99 kg/m²     Chaperone present.       Constitutional: General appearance: healthy, well-nourished and well-developed  Psychiatric:  Orientation to time, place and person. Normal mood and affect and active, alert   Abdomen: Auscultation/Inspection/Palpation: No tenderness or masses. Soft, nondistended      Female Genitalia:      Vulva: no masses, atrophy or lesions      Bladder/Urethra: no urethral discharge or mass, normal meatus, bladder non-distended.      Vagina: no tenderness, erythema, cystocele, rectocele, abnormal vaginal discharge, or vesicle(s) or ulcers     Cervix: no discharge or cervical motion tenderness and grossly normal     Uterus: normal size and shape and midline, non-tender, and no uterine prolapse.     Adnexa/Parametria: no parametrial tenderness or mass, no adnexal tenderness or ovarian mass.         Assessment/Plan:  1. BMI 29.0-29.9,adult  Obesity, especially class III obesity (BMI >= 40) is associated with numerous adverse pregnancy outcomes. These include but are not limited to miscarriage, poor ultrasound visualization, increase in congenital malformations (especially NTD's ),  birth, ascending infections, gestational diabetes, hypertensive diseases of pregnancy, macrosomia, shoulder dystocia, cerebral palsy and surgical complications such as wound infections, dehiscence and thrombosis. Macrosomia and the incidence of intrapartum complications related to macrosomia, such as shoulder dystocia, malpresentation, hemorrhage, and fourth degree laceration are also increased in obese gravidas. Obese women also have a high rate of  delivery and are more likely to have surgical, anesthetic, or postpartum complications.  birth in obese gravidas is primarily associated with " obesity-related medical and  complications, rather than an intrinsic predisposition to spontaneous  labor.     Recommendations:  Recommended gestational weight gain of 11-20 pounds. Consider dietary counseling.  Initiate low dose aspirin 81 mg daily for preeclampsia risk reduction at 12-16 weeks  Patients should receive adequate counseling regarding risk factors and possible difficulties with accurate labor monitoring,  delivery operative complications, and anesthesia.   With the increased risk of gestational diabetes, early 1st trimester screening is often advocated, however, there are no data demonstrating the effectiveness of this early screening in improving pregnancy outcomes.   Fetal growth assessment via fundal height monitoring is difficult in the setting of obesity. When the fundal height cannot be reliably followed or BMI ? 40, ultrasound evaluation of fetal growth is recommended at 32 weeks or sooner if other co-morbidities or indications exist.   Patients with a BMI of 40 or greater are at the highest risk for adverse pregnancy outcome, including stillbirth. Therefore, weekly  surveillance is recommended beginning at 34 weeks until delivery; other co-morbidities may require more frequent testing.  Consider Lovenox 40 mg BID for VTE prophylaxis while admitted to the hospital (antepartum or postpartum) if BMI >= 40.        -     aspirin (ECOTRIN) 81 MG EC tablet; Take 1 tablet (81 mg total) by mouth once daily.  Dispense: 90 tablet; Refill: 1    2. 15 weeks gestation of pregnancy  We discussed diet/supplements and modifiable risk factors.     Patients advised to continue moderate physical activity.       Patients will report unusual headaches, visual disturbances, pelvic pain or cramping, vaginal bleeding, rupture of membranes, extreme swelling in hands or face, diminished urine volume, any prolonged illness or infection, or persistent symptoms of labor.       +FHT   GA:  15w0d by LMP; 14w2d by US  GUERRERO: 1/1/2025 by LMP  PNL, PNV, folic acid   Desires Warren, MSAFP  Anatomy US  Pap gc/cz/tv  Follow up 4 weeks      Educated on compliance of future appts  Future Appointments   Date Time Provider Department Center   7/10/2024 11:05 AM LAB, [facility]   8/6/2024  2:10 PM Marah Joiner MD [facility] [place] [facility]         This note was transcribed by Ofe Chavarria MA. There may be transcription errors as a result, however minimal. Effort has been made to ensure accuracy of transcription, but any obvious errors or omissions should be clarified with the author of the document.

## 2024-07-10 ENCOUNTER — INITIAL PRENATAL (OUTPATIENT)
Dept: OBSTETRICS AND GYNECOLOGY | Facility: CLINIC | Age: 24
End: 2024-07-10
Payer: MEDICAID

## 2024-07-10 ENCOUNTER — LAB VISIT (OUTPATIENT)
Dept: LAB | Facility: HOSPITAL | Age: 24
End: 2024-07-10
Attending: OBSTETRICS & GYNECOLOGY
Payer: MEDICAID

## 2024-07-10 VITALS
HEIGHT: 64 IN | DIASTOLIC BLOOD PRESSURE: 72 MMHG | BODY MASS INDEX: 29.37 KG/M2 | TEMPERATURE: 97 F | SYSTOLIC BLOOD PRESSURE: 118 MMHG | WEIGHT: 172 LBS

## 2024-07-10 DIAGNOSIS — Z34.00 INITIAL OBSTETRIC VISIT, ANTEPARTUM: ICD-10-CM

## 2024-07-10 DIAGNOSIS — Z3A.15 15 WEEKS GESTATION OF PREGNANCY: ICD-10-CM

## 2024-07-10 DIAGNOSIS — Z12.4 CERVICAL CANCER SCREENING: ICD-10-CM

## 2024-07-10 LAB
ABO AND RH: NORMAL
AMPHET UR QL SCN: NEGATIVE
ANTIBODY SCREEN: NORMAL
BARBITURATE SCN PRESENT UR: NEGATIVE
BENZODIAZ UR QL SCN: NEGATIVE
BILIRUB UR QL STRIP: NEGATIVE
C TRACH RRNA SPEC QL NAA+PROBE: NEGATIVE
CANNABINOIDS UR QL SCN: NEGATIVE
COCAINE UR QL SCN: NEGATIVE
ERYTHROCYTE [DISTWIDTH] IN BLOOD BY AUTOMATED COUNT: 11.9 % (ref 11–14.5)
EXT MATERNIT21: NORMAL
FINAL PATHOLOGIC DIAGNOSIS: NORMAL
GLUCOSE UR QL STRIP: NEGATIVE
HBV SURFACE AG SERPL QL IA: NEGATIVE
HBV SURFACE AG SERPL QL IA: NORMAL
HCT VFR BLD AUTO: 40.5 % (ref 36–48)
HCV AB SERPL QL IA: NONREACTIVE
HEMOGLOBIN BANDS: NORMAL
HGB BLD-MCNC: 14.2 G/DL (ref 11.8–16)
HIV 1+2 AB+HIV1 P24 AG SERPL QL IA: NONREACTIVE
KETONES UR QL STRIP: NEGATIVE
LEUKOCYTE ESTERASE UR QL STRIP: NEGATIVE
MCH RBC QN AUTO: 32.9 PG (ref 27–34)
MCHC RBC AUTO-ENTMCNC: 35.1 G/DL (ref 31–37)
MCV RBC AUTO: 93.8 FL (ref 79–99)
METHADONE UR QL SCN: NEGATIVE
N GONORRHOEA RRNA SPEC QL NAA+PROBE: NEGATIVE
NRBC BLD AUTO-RTO: 0 %
OPIATES UR QL SCN: NEGATIVE
PCP UR QL: NEGATIVE
PH UR: 6 [PH] (ref 5–8)
PH, POC UA: 6.5
PLATELET # BLD AUTO: 208 X10(3)/MCL (ref 140–371)
PMV BLD AUTO: 10.5 FL (ref 9.4–12.4)
POC BLOOD, URINE: NEGATIVE
POC NITRATES, URINE: NEGATIVE
PROT UR QL STRIP: NEGATIVE
RBC # BLD AUTO: 4.32 X10(6)/MCL (ref 4–5.1)
RUBELLA AB, IGG (OLG): 115 IU/ML
SP GR UR STRIP: 1.02 (ref 1–1.03)
SPECIMEN OUTDATE: NORMAL
T PALLIDUM AB SER QL: NONREACTIVE
UROBILINOGEN UR STRIP-ACNC: 2 (ref 0.1–1.1)
WBC # BLD AUTO: 9.97 X10(3)/MCL (ref 4–11.5)

## 2024-07-10 PROCEDURE — 86850 RBC ANTIBODY SCREEN: CPT | Performed by: OBSTETRICS & GYNECOLOGY

## 2024-07-10 PROCEDURE — 86787 VARICELLA-ZOSTER ANTIBODY: CPT

## 2024-07-10 PROCEDURE — 82105 ALPHA-FETOPROTEIN SERUM: CPT

## 2024-07-10 PROCEDURE — 83020 HEMOGLOBIN ELECTROPHORESIS: CPT

## 2024-07-10 PROCEDURE — 87491 CHLMYD TRACH DNA AMP PROBE: CPT | Performed by: OBSTETRICS & GYNECOLOGY

## 2024-07-10 PROCEDURE — 86803 HEPATITIS C AB TEST: CPT

## 2024-07-10 PROCEDURE — 87591 N.GONORRHOEAE DNA AMP PROB: CPT | Performed by: OBSTETRICS & GYNECOLOGY

## 2024-07-10 PROCEDURE — 85027 COMPLETE CBC AUTOMATED: CPT

## 2024-07-10 PROCEDURE — 86780 TREPONEMA PALLIDUM: CPT

## 2024-07-10 PROCEDURE — 87389 HIV-1 AG W/HIV-1&-2 AB AG IA: CPT

## 2024-07-10 PROCEDURE — 87661 TRICHOMONAS VAGINALIS AMPLIF: CPT | Performed by: OBSTETRICS & GYNECOLOGY

## 2024-07-10 PROCEDURE — 36415 COLL VENOUS BLD VENIPUNCTURE: CPT

## 2024-07-10 PROCEDURE — 86762 RUBELLA ANTIBODY: CPT

## 2024-07-10 PROCEDURE — 87340 HEPATITIS B SURFACE AG IA: CPT

## 2024-07-10 RX ORDER — PRENATAL WITH FERROUS FUM AND FOLIC ACID 3080; 920; 120; 400; 22; 1.84; 3; 20; 10; 1; 12; 200; 27; 25; 2 [IU]/1; [IU]/1; MG/1; [IU]/1; MG/1; MG/1; MG/1; MG/1; MG/1; MG/1; UG/1; MG/1; MG/1; MG/1; MG/1
1 TABLET ORAL DAILY
Qty: 30 TABLET | Refills: 11 | Status: SHIPPED | OUTPATIENT
Start: 2024-07-10 | End: 2025-07-10

## 2024-07-10 RX ORDER — ASPIRIN 81 MG/1
81 TABLET ORAL DAILY
Qty: 90 TABLET | Refills: 1 | Status: SHIPPED | OUTPATIENT
Start: 2024-07-10 | End: 2025-07-10

## 2024-07-11 LAB
HGB A MFR BLD ELPH: 97.5 % (ref 95.8–98)
HGB A2 MFR BLD ELPH: 2.5 % (ref 2–3.3)
HGB F MFR BLD ELPH: 0 % (ref 0–0.9)
HGB FRACT BLD ELPH-IMP: NORMAL
M HPLC HB VARIANT, B: NORMAL
VZV IGG SER IA-ACNC: 2.2
VZV IGG SER QL IA: POSITIVE

## 2024-07-12 LAB
BACTERIA UR CULT: NORMAL
MAYO GENERIC ORDERABLE RESULT: NORMAL

## 2024-07-17 ENCOUNTER — PATIENT MESSAGE (OUTPATIENT)
Dept: OTHER | Facility: OTHER | Age: 24
End: 2024-07-17
Payer: MEDICAID

## 2024-07-24 ENCOUNTER — PATIENT MESSAGE (OUTPATIENT)
Dept: OTHER | Facility: OTHER | Age: 24
End: 2024-07-24
Payer: MEDICAID

## 2024-08-06 ENCOUNTER — ROUTINE PRENATAL (OUTPATIENT)
Dept: OBSTETRICS AND GYNECOLOGY | Facility: CLINIC | Age: 24
End: 2024-08-06
Payer: MEDICAID

## 2024-08-06 VITALS
SYSTOLIC BLOOD PRESSURE: 120 MMHG | DIASTOLIC BLOOD PRESSURE: 76 MMHG | BODY MASS INDEX: 29.92 KG/M2 | WEIGHT: 171.63 LBS

## 2024-08-06 DIAGNOSIS — O99.210 OBESITY IN PREGNANCY, ANTEPARTUM: Primary | ICD-10-CM

## 2024-08-06 DIAGNOSIS — Z3A.18 18 WEEKS GESTATION OF PREGNANCY: ICD-10-CM

## 2024-08-06 LAB
BILIRUB UR QL STRIP: NORMAL
GLUCOSE UR QL STRIP: NEGATIVE
KETONES UR QL STRIP: NORMAL
LEUKOCYTE ESTERASE UR QL STRIP: NEGATIVE
PH, POC UA: NORMAL
POC BLOOD, URINE: NORMAL
POC NITRATES, URINE: NEGATIVE
PROT UR QL STRIP: NEGATIVE
SP GR UR STRIP: NORMAL (ref 1–1.03)
UROBILINOGEN UR STRIP-ACNC: NORMAL (ref 0.3–2.2)

## 2024-08-06 PROCEDURE — 99213 OFFICE O/P EST LOW 20 MIN: CPT | Mod: TH,,, | Performed by: OBSTETRICS & GYNECOLOGY

## 2024-08-14 ENCOUNTER — HOSPITAL ENCOUNTER (OUTPATIENT)
Dept: RADIOLOGY | Facility: HOSPITAL | Age: 24
Discharge: HOME | End: 2024-08-14
Attending: OBSTETRICS & GYNECOLOGY
Payer: MEDICAID

## 2024-08-14 ENCOUNTER — PATIENT MESSAGE (OUTPATIENT)
Dept: OTHER | Facility: OTHER | Age: 24
End: 2024-08-14
Payer: MEDICAID

## 2024-08-14 DIAGNOSIS — Z3A.15 15 WEEKS GESTATION OF PREGNANCY: ICD-10-CM

## 2024-08-14 PROCEDURE — 76805 OB US >/= 14 WKS SNGL FETUS: CPT | Mod: TC

## 2024-09-03 ENCOUNTER — ROUTINE PRENATAL (OUTPATIENT)
Dept: OBSTETRICS AND GYNECOLOGY | Facility: CLINIC | Age: 24
End: 2024-09-03
Payer: MEDICAID

## 2024-09-03 VITALS
SYSTOLIC BLOOD PRESSURE: 108 MMHG | HEIGHT: 64 IN | WEIGHT: 177 LBS | DIASTOLIC BLOOD PRESSURE: 60 MMHG | BODY MASS INDEX: 30.22 KG/M2 | TEMPERATURE: 96 F

## 2024-09-03 DIAGNOSIS — O41.00X1 OLIGOHYDRAMNIOS, ANTEPARTUM, FETUS 1 OF MULTIPLE GESTATION: ICD-10-CM

## 2024-09-03 DIAGNOSIS — O26.899 HEADACHE IN PREGNANCY, ANTEPARTUM: ICD-10-CM

## 2024-09-03 DIAGNOSIS — Z3A.22 22 WEEKS GESTATION OF PREGNANCY: ICD-10-CM

## 2024-09-03 DIAGNOSIS — O36.4XX0 IUFD AT 20 WEEKS OR MORE OF GESTATION: Primary | ICD-10-CM

## 2024-09-03 DIAGNOSIS — R51.9 HEADACHE IN PREGNANCY, ANTEPARTUM: ICD-10-CM

## 2024-09-03 DIAGNOSIS — O99.210 OBESITY IN PREGNANCY, ANTEPARTUM: ICD-10-CM

## 2024-09-03 DIAGNOSIS — O36.4XX0 FETAL DEMISE, GREATER THAN 22 WEEKS, ANTEPARTUM, SINGLE OR UNSPECIFIED FETUS: ICD-10-CM

## 2024-09-03 LAB
BILIRUB UR QL STRIP: ABNORMAL
GLUCOSE UR QL STRIP: NEGATIVE
KETONES UR QL STRIP: ABNORMAL
LEUKOCYTE ESTERASE UR QL STRIP: POSITIVE
PH, POC UA: ABNORMAL
POC BLOOD, URINE: ABNORMAL
POC NITRATES, URINE: NEGATIVE
PROT UR QL STRIP: POSITIVE
SP GR UR STRIP: ABNORMAL (ref 1–1.03)
UROBILINOGEN UR STRIP-ACNC: ABNORMAL (ref 0.3–2.2)

## 2024-09-03 RX ORDER — CEFAZOLIN SODIUM 2 G/50ML
2 SOLUTION INTRAVENOUS ONCE AS NEEDED
Status: CANCELLED | OUTPATIENT
Start: 2024-09-03 | End: 2036-01-31

## 2024-09-03 RX ORDER — ACETAMINOPHEN 325 MG/1
650 TABLET ORAL EVERY 6 HOURS PRN
Status: CANCELLED | OUTPATIENT
Start: 2024-09-03

## 2024-09-03 RX ORDER — OXYTOCIN 10 [USP'U]/ML
10 INJECTION, SOLUTION INTRAMUSCULAR; INTRAVENOUS ONCE AS NEEDED
Status: CANCELLED | OUTPATIENT
Start: 2024-09-03 | End: 2036-01-31

## 2024-09-03 RX ORDER — PROCHLORPERAZINE EDISYLATE 5 MG/ML
5 INJECTION INTRAMUSCULAR; INTRAVENOUS EVERY 6 HOURS PRN
Status: CANCELLED | OUTPATIENT
Start: 2024-09-03

## 2024-09-03 RX ORDER — SODIUM CHLORIDE, SODIUM LACTATE, POTASSIUM CHLORIDE, CALCIUM CHLORIDE 600; 310; 30; 20 MG/100ML; MG/100ML; MG/100ML; MG/100ML
INJECTION, SOLUTION INTRAVENOUS CONTINUOUS
Status: CANCELLED | OUTPATIENT
Start: 2024-09-03

## 2024-09-03 RX ORDER — OXYTOCIN-SODIUM CHLORIDE 0.9% IV SOLN 30 UNIT/500ML 30-0.9/5 UT/ML-%
95 SOLUTION INTRAVENOUS ONCE AS NEEDED
Status: CANCELLED | OUTPATIENT
Start: 2024-09-03 | End: 2036-01-31

## 2024-09-03 RX ORDER — MISOPROSTOL 100 UG/1
800 TABLET ORAL ONCE AS NEEDED
Status: CANCELLED | OUTPATIENT
Start: 2024-09-03 | End: 2036-01-31

## 2024-09-03 RX ORDER — BUTORPHANOL TARTRATE 2 MG/ML
2 INJECTION INTRAMUSCULAR; INTRAVENOUS
Status: CANCELLED | OUTPATIENT
Start: 2024-09-03

## 2024-09-03 RX ORDER — DIPHENOXYLATE HYDROCHLORIDE AND ATROPINE SULFATE 2.5; .025 MG/1; MG/1
2 TABLET ORAL EVERY 6 HOURS PRN
Status: CANCELLED | OUTPATIENT
Start: 2024-09-03

## 2024-09-03 RX ORDER — MISOPROSTOL 100 UG/1
400 TABLET ORAL EVERY 4 HOURS PRN
Status: CANCELLED | OUTPATIENT
Start: 2024-09-03

## 2024-09-03 RX ORDER — CALCIUM CARBONATE 200(500)MG
500 TABLET,CHEWABLE ORAL 3 TIMES DAILY PRN
Status: CANCELLED | OUTPATIENT
Start: 2024-09-03

## 2024-09-03 RX ORDER — CARBOPROST TROMETHAMINE 250 UG/ML
250 INJECTION, SOLUTION INTRAMUSCULAR
Status: CANCELLED | OUTPATIENT
Start: 2024-09-03

## 2024-09-03 RX ORDER — MUPIROCIN 20 MG/G
OINTMENT TOPICAL
Status: CANCELLED | OUTPATIENT
Start: 2024-09-03

## 2024-09-03 RX ORDER — TRANEXAMIC ACID 10 MG/ML
1000 INJECTION, SOLUTION INTRAVENOUS EVERY 30 MIN PRN
Status: CANCELLED | OUTPATIENT
Start: 2024-09-03

## 2024-09-03 RX ORDER — SODIUM CHLORIDE 9 MG/ML
INJECTION, SOLUTION INTRAVENOUS
Status: CANCELLED | OUTPATIENT
Start: 2024-09-03

## 2024-09-03 RX ORDER — ASCORBIC ACID, CHOLECALCIFEROL, DL-.ALPHA.-TOCOPHEROL ACETATE, THIAMINE HYDROCHLORIDE, RIBOFLAVIN, NIACINAMIDE, PYRIDOXINE HYDROCHLORIDE, FOLIC ACID, CYANOCOBALAMIN, CALCIUM CARBONATE, FERROUS FUMARATE, ZINC OXIDE, CUPRIC SULFATE 100; 400; 30; 1.6; 1.6; 20; 3.1; 1; 12; 200; 27; 10; 2 MG/1; [IU]/1; [IU]/1; MG/1; MG/1; MG/1; MG/1; MG/1; UG/1; MG/1; MG/1; MG/1; MG/1
1 TABLET, COATED ORAL
Status: ON HOLD | COMMUNITY
Start: 2024-07-10 | End: 2024-09-05 | Stop reason: HOSPADM

## 2024-09-03 RX ORDER — OXYTOCIN-SODIUM CHLORIDE 0.9% IV SOLN 30 UNIT/500ML 30-0.9/5 UT/ML-%
10 SOLUTION INTRAVENOUS ONCE
Status: CANCELLED | OUTPATIENT
Start: 2024-09-03 | End: 2024-09-03

## 2024-09-03 RX ORDER — MISOPROSTOL 100 UG/1
800 TABLET ORAL ONCE AS NEEDED
Status: CANCELLED | OUTPATIENT
Start: 2024-09-03

## 2024-09-03 RX ORDER — LIDOCAINE HYDROCHLORIDE 10 MG/ML
10 INJECTION, SOLUTION INFILTRATION; PERINEURAL ONCE AS NEEDED
Status: CANCELLED | OUTPATIENT
Start: 2024-09-03 | End: 2036-01-31

## 2024-09-03 RX ORDER — OXYTOCIN-SODIUM CHLORIDE 0.9% IV SOLN 30 UNIT/500ML 30-0.9/5 UT/ML-%
10 SOLUTION INTRAVENOUS ONCE AS NEEDED
Status: CANCELLED | OUTPATIENT
Start: 2024-09-03 | End: 2036-01-31

## 2024-09-03 RX ORDER — METHYLERGONOVINE MALEATE 0.2 MG/ML
200 INJECTION INTRAVENOUS ONCE AS NEEDED
Status: CANCELLED | OUTPATIENT
Start: 2024-09-03 | End: 2036-01-31

## 2024-09-03 RX ORDER — OXYTOCIN-SODIUM CHLORIDE 0.9% IV SOLN 30 UNIT/500ML 30-0.9/5 UT/ML-%
95 SOLUTION INTRAVENOUS ONCE
Status: CANCELLED | OUTPATIENT
Start: 2024-09-03 | End: 2024-09-03

## 2024-09-03 RX ORDER — ONDANSETRON 4 MG/1
8 TABLET, ORALLY DISINTEGRATING ORAL EVERY 8 HOURS PRN
Status: CANCELLED | OUTPATIENT
Start: 2024-09-03

## 2024-09-03 RX ORDER — SIMETHICONE 80 MG
1 TABLET,CHEWABLE ORAL 4 TIMES DAILY PRN
Status: CANCELLED | OUTPATIENT
Start: 2024-09-03

## 2024-09-03 RX ORDER — BUTORPHANOL TARTRATE 1 MG/ML
1 INJECTION INTRAMUSCULAR; INTRAVENOUS
Status: CANCELLED | OUTPATIENT
Start: 2024-09-03

## 2024-09-03 NOTE — H&P (VIEW-ONLY)
Chief Complaint:  Routine Prenatal Visit    History of Present Illness:  Katerin Chen is a 24 y.o. year old  at 22w6d presents for routine ob exam. C/o frequent headaches. Has not tried OTC medication. No headache currently. Denies vision changes.        Review of Systems:  General/Constitutional: Fever denies .    Skin: Rash denies.   Respiratory: Cough denies. SOB denies. Wheezing denies .   Cardiovascular: Chest pain denies. Dizziness denies. Palpitations denies. Swelling in hands/feet denies    Gastrointestinal: Abdominal pain denies. Constipation denies. Diarrhea denies. Heartburn denies. Nausea denies. Vomiting denies    Genitourinary: Painful urination denies.   Gynecologic: Vaginal bleeding denies. Vaginal discharge Normal. Leaking amniotic fluid denies. Contractions denies    Psychiatric: Depressed mood denies. Suicidal thoughts denies.     OB History    Para Term  AB Living   3 1 1 0 1 1   SAB IAB Ectopic Multiple Live Births   1 0 0 0 1      # 1 - Date: 2021, Sex: None, Weight: None, GA: 7w0d, Type: Spontaneous , Apgar1: None, Apgar5: None, Living: Fetal Demise, Birth Comments: None    # 2 - Date: 22, Sex: Female, Weight: 3.345 kg (7 lb 6 oz), GA: 38w4d, Type: Vaginal, Spontaneous, Apgar1: None, Apgar5: None, Living: Living, Birth Comments: None    # 3 - Date: None, Sex: None, Weight: None, GA: None, Type: None, Apgar1: None, Apgar5: None, Living: None, Birth Comments: None      Past Medical History:   Diagnosis Date    ADHD     Anxiety disorder, unspecified     Mental disorder        Current Outpatient Medications:     aspirin (ECOTRIN) 81 MG EC tablet, Take 1 tablet (81 mg total) by mouth once daily., Disp: 90 tablet, Rfl: 1     27 mg iron- 1 mg Tab, Take 1 tablet by mouth. (Patient not taking: Reported on 9/3/2024), Disp: , Rfl:     Review of patient's allergies indicates:  No Known Allergies    Past Surgical History:   Procedure Laterality Date     "TONSILLECTOMY Bilateral      Family History   Problem Relation Name Age of Onset    Lung cancer Maternal Grandfather      Diabetes Father      Heart disease Father      Breast cancer Neg Hx      Colon cancer Neg Hx      Ovarian cancer Neg Hx      Uterine cancer Neg Hx      Cervical cancer Neg Hx       Social History     Socioeconomic History    Marital status: Single   Tobacco Use    Smoking status: Former     Types: Vaping with nicotine     Start date:      Quit date: 2024     Years since quittin.3     Passive exposure: Never    Smokeless tobacco: Never   Substance and Sexual Activity    Alcohol use: Not Currently    Drug use: Never    Sexual activity: Yes     Partners: Male     Birth control/protection: None     Comment: STI: +TV, +CT       Physical Exam:  /60 (BP Location: Left arm, Patient Position: Sitting, BP Method: Medium (Manual))   Temp 96.3 °F (35.7 °C) (Temporal)   Ht 5' 3.5" (1.613 m)   Wt 80.3 kg (177 lb)   LMP 2024   Breastfeeding No   BMI 30.86 kg/m²     Chaperone present    Constitutional: General appearance: healthy, well-nourished and well-developed   Psychiatric:  Orientation to time, place and person. Normal mood and affect and active, alert  Cardiovascular: RRR, no murmurs, gallops or rub  Respiratory: clear to auscultate bilaterally, no wheezes, rales, or rhonchi  Abdomen: Auscultation/Inspection/Palpation: No tenderness or masses. Soft, nondistended   Extremities: no CCE      ABO/Rh:   Lab Results   Component Value Date/Time    ABORH A POS 07/10/2024 10:50 AM    ABSCREEN NEG 07/10/2024 10:50 AM     H&H:  Lab Results   Component Value Date/Time    HGB 14.2 07/10/2024 10:50 AM    HCT 40.5 07/10/2024 10:50 AM     Platelet:  Lab Results   Component Value Date/Time     07/10/2024 10:50 AM     Osulivan: No results found for: "TISA7SN", "YGJN1FR", "FQFU6KS"  HIV:   Lab Results   Component Value Date/Time    HIV Nonreactive 07/10/2024 10:50 AM     RPR:  Lab Results "   Component Value Date/Time    SYPHAB Nonreactive 07/10/2024 10:50 AM     Hepatitis B Surface Antigen:  Lab Results   Component Value Date/Time    HEPBSAG Negative 07/10/2024 10:50 AM     Hepatitis C:  Lab Results   Component Value Date/Time    HEPCAB Nonreactive 07/10/2024 10:50 AM     Rubella Immune Status:  Lab Results   Component Value Date/Time    RUBELLAIGG 115.00 07/10/2024 10:50 AM     Chlamydia:  Lab Results   Component Value Date/Time    CTRNA Negative 07/10/2024 12:00 AM     Gonorrhea:  Lab Results   Component Value Date/Time    FACVSPECIMEN Negative 07/10/2024 12:00 AM      Trichomoniasis:  Lab Results   Component Value Date/Time    TRVGRNA Negative 07/19/2023 11:57 AM      Last PAP Date: 7/16/2024    ANATOMY SCAN  FINDINGS:  Transabdominal imaging was performed.  There is a single intrauterine pregnancy with what appears to be unremarkable female genitalia currently in a transverse presentation.  The fetus demonstrates normal spontaneous motility and normal cardiac activity with a fetal heart rate of 121 beats per minute.  A grade 0 placenta is located anteriorly.  The volume of amniotic fluid is within normal limits with an NEW of 12.8 cm and 4 vertical pockets measuring greater than 2 cm.  The fetal head, spine (where visualized), heart, gastric lumen, kidneys, three-vessel umbilical cord and cord insertion site (where visualized), urinary bladder and extremities (where visualized) appear unremarkable.  I see no definite fetal or maternal abnormalities based on these images.     Fetal measurements: Biparietal diameter is 4.2 cm/18 weeks 5 days/9th percentile, head circumference is 15.9 cm/18 weeks 5 days/4th percentile, abdominal circumference is 13.3 cm/18 weeks 5 days/11th percentile, femur length is 3.0 cm/19 weeks 3 days/21st percentile and the estimated fetal weight is 269 g/19 weeks 0 days/8th percentile (based on estimated gestational age).     Impression:     1. There is a single intrauterine  pregnancy currently in a transverse presentation with a mean sonographic gestational age of 18 weeks 6 days.  See above comments regarding fetal measurements in growth percentiles.        Electronically signed by:Jacquelin Robins  Date:                                            08/14/2024    Assessment/Plan:  1. Fetal demise, greater than 22 weeks, antepartum  2. 22 weeks gestation of pregnancy  3. Breech  4. anhydramnios, antepartum    Reviewed anatomy US w/ pt  Upon routine exam pt found to have fetal demise  Denies any recent illnesses  Pt educated  Discussed w/ pt induction  States understanding  Pt consented  [facility] on diversion currently pt to  [facility] 9/4/24 @0600     Cytotec 800mcg PV load, then 400 mcg q 3 hrs  Epidural when desires           This note was transcribed by Ofe Chavarria MA. There may be transcription errors as a result, however minimal. I agree with transcription and every effort has been made to ensure accuracy of transcription, but any obvious errors or omissions should be clarified with the author of the document.

## 2024-09-03 NOTE — PROGRESS NOTES
Chief Complaint:  Routine Prenatal Visit    History of Present Illness:  Katerin Chen is a 24 y.o. year old  at 22w6d presents for routine ob exam. C/o frequent headaches. Has not tried OTC medication. No headache currently. Denies vision changes.        Review of Systems:  General/Constitutional: Fever denies .    Skin: Rash denies.   Respiratory: Cough denies. SOB denies. Wheezing denies .   Cardiovascular: Chest pain denies. Dizziness denies. Palpitations denies. Swelling in hands/feet denies    Gastrointestinal: Abdominal pain denies. Constipation denies. Diarrhea denies. Heartburn denies. Nausea denies. Vomiting denies    Genitourinary: Painful urination denies.   Gynecologic: Vaginal bleeding denies. Vaginal discharge Normal. Leaking amniotic fluid denies. Contractions denies    Psychiatric: Depressed mood denies. Suicidal thoughts denies.     OB History    Para Term  AB Living   3 1 1 0 1 1   SAB IAB Ectopic Multiple Live Births   1 0 0 0 1      # 1 - Date: 2021, Sex: None, Weight: None, GA: 7w0d, Type: Spontaneous , Apgar1: None, Apgar5: None, Living: Fetal Demise, Birth Comments: None    # 2 - Date: 22, Sex: Female, Weight: 3.345 kg (7 lb 6 oz), GA: 38w4d, Type: Vaginal, Spontaneous, Apgar1: None, Apgar5: None, Living: Living, Birth Comments: None    # 3 - Date: None, Sex: None, Weight: None, GA: None, Type: None, Apgar1: None, Apgar5: None, Living: None, Birth Comments: None      Past Medical History:   Diagnosis Date    ADHD     Anxiety disorder, unspecified     Mental disorder        Current Outpatient Medications:     aspirin (ECOTRIN) 81 MG EC tablet, Take 1 tablet (81 mg total) by mouth once daily., Disp: 90 tablet, Rfl: 1     27 mg iron- 1 mg Tab, Take 1 tablet by mouth. (Patient not taking: Reported on 9/3/2024), Disp: , Rfl:     Review of patient's allergies indicates:  No Known Allergies    Past Surgical History:   Procedure Laterality Date     "TONSILLECTOMY Bilateral      Family History   Problem Relation Name Age of Onset    Lung cancer Maternal Grandfather      Diabetes Father      Heart disease Father      Breast cancer Neg Hx      Colon cancer Neg Hx      Ovarian cancer Neg Hx      Uterine cancer Neg Hx      Cervical cancer Neg Hx       Social History     Socioeconomic History    Marital status: Single   Tobacco Use    Smoking status: Former     Types: Vaping with nicotine     Start date:      Quit date: 2024     Years since quittin.3     Passive exposure: Never    Smokeless tobacco: Never   Substance and Sexual Activity    Alcohol use: Not Currently    Drug use: Never    Sexual activity: Yes     Partners: Male     Birth control/protection: None     Comment: STI: +TV, +CT       Physical Exam:  /60 (BP Location: Left arm, Patient Position: Sitting, BP Method: Medium (Manual))   Temp 96.3 °F (35.7 °C) (Temporal)   Ht 5' 3.5" (1.613 m)   Wt 80.3 kg (177 lb)   LMP 2024   Breastfeeding No   BMI 30.86 kg/m²     Chaperone present    Constitutional: General appearance: healthy, well-nourished and well-developed   Psychiatric:  Orientation to time, place and person. Normal mood and affect and active, alert  Cardiovascular: RRR, no murmurs, gallops or rub  Respiratory: clear to auscultate bilaterally, no wheezes, rales, or rhonchi  Abdomen: Auscultation/Inspection/Palpation: No tenderness or masses. Soft, nondistended   Extremities: no CCE      ABO/Rh:   Lab Results   Component Value Date/Time    ABORH A POS 07/10/2024 10:50 AM    ABSCREEN NEG 07/10/2024 10:50 AM     H&H:  Lab Results   Component Value Date/Time    HGB 14.2 07/10/2024 10:50 AM    HCT 40.5 07/10/2024 10:50 AM     Platelet:  Lab Results   Component Value Date/Time     07/10/2024 10:50 AM     Osulivan: No results found for: "VBDO9LH", "XRRT2EM", "PBFT0BV"  HIV:   Lab Results   Component Value Date/Time    HIV Nonreactive 07/10/2024 10:50 AM     RPR:  Lab Results "   Component Value Date/Time    SYPHAB Nonreactive 07/10/2024 10:50 AM     Hepatitis B Surface Antigen:  Lab Results   Component Value Date/Time    HEPBSAG Negative 07/10/2024 10:50 AM     Hepatitis C:  Lab Results   Component Value Date/Time    HEPCAB Nonreactive 07/10/2024 10:50 AM     Rubella Immune Status:  Lab Results   Component Value Date/Time    RUBELLAIGG 115.00 07/10/2024 10:50 AM     Chlamydia:  Lab Results   Component Value Date/Time    CTRNA Negative 07/10/2024 12:00 AM     Gonorrhea:  Lab Results   Component Value Date/Time    FACVSPECIMEN Negative 07/10/2024 12:00 AM      Trichomoniasis:  Lab Results   Component Value Date/Time    TRVGRNA Negative 07/19/2023 11:57 AM      Last PAP Date: 7/16/2024    ANATOMY SCAN  FINDINGS:  Transabdominal imaging was performed.  There is a single intrauterine pregnancy with what appears to be unremarkable female genitalia currently in a transverse presentation.  The fetus demonstrates normal spontaneous motility and normal cardiac activity with a fetal heart rate of 121 beats per minute.  A grade 0 placenta is located anteriorly.  The volume of amniotic fluid is within normal limits with an NEW of 12.8 cm and 4 vertical pockets measuring greater than 2 cm.  The fetal head, spine (where visualized), heart, gastric lumen, kidneys, three-vessel umbilical cord and cord insertion site (where visualized), urinary bladder and extremities (where visualized) appear unremarkable.  I see no definite fetal or maternal abnormalities based on these images.     Fetal measurements: Biparietal diameter is 4.2 cm/18 weeks 5 days/9th percentile, head circumference is 15.9 cm/18 weeks 5 days/4th percentile, abdominal circumference is 13.3 cm/18 weeks 5 days/11th percentile, femur length is 3.0 cm/19 weeks 3 days/21st percentile and the estimated fetal weight is 269 g/19 weeks 0 days/8th percentile (based on estimated gestational age).     Impression:     1. There is a single intrauterine  pregnancy currently in a transverse presentation with a mean sonographic gestational age of 18 weeks 6 days.  See above comments regarding fetal measurements in growth percentiles.        Electronically signed by:Jacquelin Robins  Date:                                            08/14/2024    Assessment/Plan:  1. Fetal demise, greater than 22 weeks, antepartum  2. 22 weeks gestation of pregnancy  3. Breech  4. anhydramnios, antepartum    Reviewed anatomy & today's US w/ pt  Upon routine exam pt found to have fetal demise  Denies any recent illnesses  Pt educated  Discussed w/ pt induction  States understanding  Pt consented  [facility] on diversion currently pt to  OA 9/4/24 @0600     Cytotec 800mcg PV load, then 400 mcg q 3 hrs  Epidural when desires     RTS today  Anhydramnios, no FHT, breech      This note was transcribed by Ofe Chavarria MA. There may be transcription errors as a result, however minimal. I agree with transcription and every effort has been made to ensure accuracy of transcription, but any obvious errors or omissions should be clarified with the author of the document.

## 2024-09-04 ENCOUNTER — HOSPITAL ENCOUNTER (INPATIENT)
Facility: HOSPITAL | Age: 24
LOS: 1 days | Discharge: HOME | End: 2024-09-05
Attending: OBSTETRICS & GYNECOLOGY | Admitting: OBSTETRICS & GYNECOLOGY
Payer: MEDICAID

## 2024-09-04 ENCOUNTER — ANESTHESIA EVENT (OUTPATIENT)
Dept: OBSTETRICS AND GYNECOLOGY | Facility: HOSPITAL | Age: 24
End: 2024-09-04

## 2024-09-04 ENCOUNTER — ANESTHESIA EVENT (OUTPATIENT)
Dept: RADIOLOGY | Facility: HOSPITAL | Age: 24
End: 2024-09-04
Payer: MEDICAID

## 2024-09-04 ENCOUNTER — ANESTHESIA (OUTPATIENT)
Dept: RADIOLOGY | Facility: HOSPITAL | Age: 24
End: 2024-09-04
Payer: MEDICAID

## 2024-09-04 ENCOUNTER — ANESTHESIA (OUTPATIENT)
Dept: OBSTETRICS AND GYNECOLOGY | Facility: HOSPITAL | Age: 24
End: 2024-09-04

## 2024-09-04 DIAGNOSIS — O36.4XX0 IUFD AT 20 WEEKS OR MORE OF GESTATION: ICD-10-CM

## 2024-09-04 DIAGNOSIS — O36.4XX0 FETAL DEMISE, GREATER THAN 22 WEEKS, ANTEPARTUM, SINGLE OR UNSPECIFIED FETUS: Primary | ICD-10-CM

## 2024-09-04 LAB
ALBUMIN SERPL-MCNC: 3.2 G/DL (ref 3.4–5)
ALBUMIN/GLOB SERPL: 1.2 RATIO
ALP SERPL-CCNC: 55 UNIT/L (ref 50–144)
ALT SERPL-CCNC: 28 UNIT/L (ref 1–45)
AMPHET UR QL SCN: NEGATIVE
ANION GAP SERPL CALC-SCNC: 6 MEQ/L (ref 2–13)
APTT PPP: 25.4 SECONDS (ref 23–29.4)
AST SERPL-CCNC: 40 UNIT/L (ref 14–36)
BARBITURATE SCN PRESENT UR: NEGATIVE
BASOPHILS # BLD AUTO: 0.02 X10(3)/MCL (ref 0.01–0.08)
BASOPHILS NFR BLD AUTO: 0.2 % (ref 0.1–1.2)
BENZODIAZ UR QL SCN: NEGATIVE
BILIRUB SERPL-MCNC: 0.3 MG/DL (ref 0–1)
BUN SERPL-MCNC: 9 MG/DL (ref 7–20)
CALCIUM SERPL-MCNC: 10 MG/DL (ref 8.4–10.2)
CANNABINOIDS UR QL SCN: NEGATIVE
CHLORIDE SERPL-SCNC: 109 MMOL/L (ref 98–110)
CO2 SERPL-SCNC: 20 MMOL/L (ref 21–32)
COCAINE UR QL SCN: NEGATIVE
CREAT SERPL-MCNC: 0.49 MG/DL (ref 0.66–1.25)
CREAT/UREA NIT SERPL: 18 (ref 12–20)
D DIMER PPP IA.FEU-MCNC: 1.29 MG/L (ref 0.19–0.5)
EOSINOPHIL # BLD AUTO: 0.1 X10(3)/MCL (ref 0.04–0.36)
EOSINOPHIL NFR BLD AUTO: 1 % (ref 0.7–7)
ERYTHROCYTE [DISTWIDTH] IN BLOOD BY AUTOMATED COUNT: 12.1 % (ref 11–14.5)
EST. AVERAGE GLUCOSE BLD GHB EST-MCNC: 88.2 MG/DL (ref 70–115)
FIBRINOGEN PPP-MCNC: 396 MG/DL (ref 210–463)
GFR SERPLBLD CREATININE-BSD FMLA CKD-EPI: >90 ML/MIN/1.73/M2
GLOBULIN SER-MCNC: 2.6 GM/DL (ref 2–3.9)
GLUCOSE SERPL-MCNC: 90 MG/DL (ref 70–115)
GROUP & RH: NORMAL
HBA1C MFR BLD: 4.7 % (ref 4–6)
HBV SURFACE AG SERPL QL IA: NEGATIVE
HBV SURFACE AG SERPL QL IA: NORMAL
HCT VFR BLD AUTO: 36.6 % (ref 36–48)
HGB BLD-MCNC: 12.9 G/DL (ref 11.8–16)
HIV 1+2 AB+HIV1 P24 AG SERPL QL IA: NONREACTIVE
IMM GRANULOCYTES # BLD AUTO: 0.04 X10(3)/MCL (ref 0–0.03)
IMM GRANULOCYTES NFR BLD AUTO: 0.4 % (ref 0–0.5)
INDIRECT COOMBS: NORMAL
INR PPP: 0.9
KLEIHAUER-BETKE STAIN: NORMAL
LYMPHOCYTES # BLD AUTO: 3.1 X10(3)/MCL (ref 1.16–3.74)
LYMPHOCYTES NFR BLD AUTO: 30.3 % (ref 20–55)
MCH RBC QN AUTO: 33.1 PG (ref 27–34)
MCHC RBC AUTO-ENTMCNC: 35.2 G/DL (ref 31–37)
MCV RBC AUTO: 93.8 FL (ref 79–99)
METHADONE UR QL SCN: NEGATIVE
MONOCYTES # BLD AUTO: 0.93 X10(3)/MCL (ref 0.24–0.36)
MONOCYTES NFR BLD AUTO: 9.1 % (ref 4.7–12.5)
NEUTROPHILS # BLD AUTO: 6.04 X10(3)/MCL (ref 1.56–6.13)
NEUTROPHILS NFR BLD AUTO: 59 % (ref 37–73)
NRBC BLD AUTO-RTO: 0 %
OPIATES UR QL SCN: NEGATIVE
PCP UR QL: NEGATIVE
PH UR: 6 [PH] (ref 5–8)
PLATELET # BLD AUTO: 177 X10(3)/MCL (ref 140–371)
PMV BLD AUTO: 11.5 FL (ref 9.4–12.4)
POTASSIUM SERPL-SCNC: 4.1 MMOL/L (ref 3.5–5.1)
PROT SERPL-MCNC: 5.8 GM/DL (ref 6.3–8.2)
PROTHROMBIN TIME: 9.7 SECONDS (ref 9.3–11.9)
RBC # BLD AUTO: 3.9 X10(6)/MCL (ref 4–5.1)
RUBELLA AB, IGG (OLG): 107 IU/ML
SODIUM SERPL-SCNC: 135 MMOL/L (ref 136–145)
SPECIMEN OUTDATE: NORMAL
T PALLIDUM AB SER QL: NONREACTIVE
TSH SERPL-ACNC: 3.49 UIU/ML (ref 0.36–3.74)
TT IMM BOVINE THROMBIN PPP: 18 SECONDS (ref 0–22)
WBC # BLD AUTO: 10.23 X10(3)/MCL (ref 4–11.5)

## 2024-09-04 PROCEDURE — 87389 HIV-1 AG W/HIV-1&-2 AB AG IA: CPT | Performed by: OBSTETRICS & GYNECOLOGY

## 2024-09-04 PROCEDURE — 86644 CMV ANTIBODY: CPT | Performed by: OBSTETRICS & GYNECOLOGY

## 2024-09-04 PROCEDURE — 85610 PROTHROMBIN TIME: CPT | Performed by: OBSTETRICS & GYNECOLOGY

## 2024-09-04 PROCEDURE — 86147 CARDIOLIPIN ANTIBODY EA IG: CPT | Performed by: OBSTETRICS & GYNECOLOGY

## 2024-09-04 PROCEDURE — 85613 RUSSELL VIPER VENOM DILUTED: CPT | Performed by: OBSTETRICS & GYNECOLOGY

## 2024-09-04 PROCEDURE — 36415 COLL VENOUS BLD VENIPUNCTURE: CPT | Performed by: OBSTETRICS & GYNECOLOGY

## 2024-09-04 PROCEDURE — 85300 ANTITHROMBIN III ACTIVITY: CPT | Performed by: OBSTETRICS & GYNECOLOGY

## 2024-09-04 PROCEDURE — 80307 DRUG TEST PRSMV CHEM ANLYZR: CPT | Performed by: OBSTETRICS & GYNECOLOGY

## 2024-09-04 PROCEDURE — 86146 BETA-2 GLYCOPROTEIN ANTIBODY: CPT | Performed by: OBSTETRICS & GYNECOLOGY

## 2024-09-04 PROCEDURE — 11000001 HC ACUTE MED/SURG PRIVATE ROOM

## 2024-09-04 PROCEDURE — 85670 THROMBIN TIME PLASMA: CPT | Performed by: OBSTETRICS & GYNECOLOGY

## 2024-09-04 PROCEDURE — 84443 ASSAY THYROID STIM HORMONE: CPT | Performed by: OBSTETRICS & GYNECOLOGY

## 2024-09-04 PROCEDURE — 86780 TREPONEMA PALLIDUM: CPT | Performed by: OBSTETRICS & GYNECOLOGY

## 2024-09-04 PROCEDURE — 25000003 PHARM REV CODE 250: Performed by: OBSTETRICS & GYNECOLOGY

## 2024-09-04 PROCEDURE — 85730 THROMBOPLASTIN TIME PARTIAL: CPT | Performed by: OBSTETRICS & GYNECOLOGY

## 2024-09-04 PROCEDURE — 62326 NJX INTERLAMINAR LMBR/SAC: CPT | Performed by: NURSE ANESTHETIST, CERTIFIED REGISTERED

## 2024-09-04 PROCEDURE — 25000003 PHARM REV CODE 250: Performed by: NURSE ANESTHETIST, CERTIFIED REGISTERED

## 2024-09-04 PROCEDURE — 83036 HEMOGLOBIN GLYCOSYLATED A1C: CPT | Performed by: OBSTETRICS & GYNECOLOGY

## 2024-09-04 PROCEDURE — 85379 FIBRIN DEGRADATION QUANT: CPT | Performed by: OBSTETRICS & GYNECOLOGY

## 2024-09-04 PROCEDURE — 63600175 PHARM REV CODE 636 W HCPCS: Performed by: OBSTETRICS & GYNECOLOGY

## 2024-09-04 PROCEDURE — 85384 FIBRINOGEN ACTIVITY: CPT | Performed by: OBSTETRICS & GYNECOLOGY

## 2024-09-04 PROCEDURE — 85025 COMPLETE CBC W/AUTO DIFF WBC: CPT | Performed by: OBSTETRICS & GYNECOLOGY

## 2024-09-04 PROCEDURE — 86901 BLOOD TYPING SEROLOGIC RH(D): CPT | Performed by: OBSTETRICS & GYNECOLOGY

## 2024-09-04 PROCEDURE — 86747 PARVOVIRUS ANTIBODY: CPT | Performed by: OBSTETRICS & GYNECOLOGY

## 2024-09-04 PROCEDURE — 86762 RUBELLA ANTIBODY: CPT | Performed by: OBSTETRICS & GYNECOLOGY

## 2024-09-04 PROCEDURE — 87340 HEPATITIS B SURFACE AG IA: CPT | Performed by: OBSTETRICS & GYNECOLOGY

## 2024-09-04 PROCEDURE — 80053 COMPREHEN METABOLIC PANEL: CPT | Performed by: OBSTETRICS & GYNECOLOGY

## 2024-09-04 PROCEDURE — 86900 BLOOD TYPING SEROLOGIC ABO: CPT | Performed by: OBSTETRICS & GYNECOLOGY

## 2024-09-04 PROCEDURE — 3E0DXGC INTRODUCTION OF OTHER THERAPEUTIC SUBSTANCE INTO MOUTH AND PHARYNX, EXTERNAL APPROACH: ICD-10-PCS | Performed by: OBSTETRICS & GYNECOLOGY

## 2024-09-04 RX ORDER — DIPHENHYDRAMINE HYDROCHLORIDE 50 MG/ML
25 INJECTION INTRAMUSCULAR; INTRAVENOUS EVERY 4 HOURS PRN
Status: DISCONTINUED | OUTPATIENT
Start: 2024-09-04 | End: 2024-09-05 | Stop reason: HOSPADM

## 2024-09-04 RX ORDER — PROCHLORPERAZINE EDISYLATE 5 MG/ML
5 INJECTION INTRAMUSCULAR; INTRAVENOUS EVERY 6 HOURS PRN
Status: DISCONTINUED | OUTPATIENT
Start: 2024-09-04 | End: 2024-09-05 | Stop reason: HOSPADM

## 2024-09-04 RX ORDER — OXYTOCIN-SODIUM CHLORIDE 0.9% IV SOLN 30 UNIT/500ML 30-0.9/5 UT/ML-%
95 SOLUTION INTRAVENOUS ONCE
Status: DISCONTINUED | OUTPATIENT
Start: 2024-09-04 | End: 2024-09-05 | Stop reason: HOSPADM

## 2024-09-04 RX ORDER — ACETAMINOPHEN 325 MG/1
650 TABLET ORAL EVERY 6 HOURS PRN
Status: DISCONTINUED | OUTPATIENT
Start: 2024-09-04 | End: 2024-09-05 | Stop reason: HOSPADM

## 2024-09-04 RX ORDER — LIDOCAINE HYDROCHLORIDE 10 MG/ML
10 INJECTION, SOLUTION INFILTRATION; PERINEURAL ONCE AS NEEDED
Status: DISCONTINUED | OUTPATIENT
Start: 2024-09-04 | End: 2024-09-05

## 2024-09-04 RX ORDER — HYDROMORPHONE HYDROCHLORIDE 1 MG/ML
1 INJECTION, SOLUTION INTRAMUSCULAR; INTRAVENOUS; SUBCUTANEOUS EVERY 6 HOURS PRN
Status: CANCELLED | OUTPATIENT
Start: 2024-09-04

## 2024-09-04 RX ORDER — ONDANSETRON 4 MG/1
8 TABLET, ORALLY DISINTEGRATING ORAL EVERY 8 HOURS PRN
Status: DISCONTINUED | OUTPATIENT
Start: 2024-09-04 | End: 2024-09-04

## 2024-09-04 RX ORDER — ALUMINUM HYDROXIDE, MAGNESIUM HYDROXIDE, AND SIMETHICONE 1200; 120; 1200 MG/30ML; MG/30ML; MG/30ML
30 SUSPENSION ORAL EVERY 6 HOURS PRN
Status: DISCONTINUED | OUTPATIENT
Start: 2024-09-04 | End: 2024-09-05 | Stop reason: HOSPADM

## 2024-09-04 RX ORDER — LIDOCAINE HYDROCHLORIDE AND EPINEPHRINE 15; 5 MG/ML; UG/ML
INJECTION, SOLUTION EPIDURAL
Status: DISCONTINUED | OUTPATIENT
Start: 2024-09-04 | End: 2024-09-04

## 2024-09-04 RX ORDER — ONDANSETRON 4 MG/1
8 TABLET, ORALLY DISINTEGRATING ORAL EVERY 8 HOURS PRN
Status: DISCONTINUED | OUTPATIENT
Start: 2024-09-04 | End: 2024-09-05 | Stop reason: HOSPADM

## 2024-09-04 RX ORDER — SIMETHICONE 80 MG
1 TABLET,CHEWABLE ORAL EVERY 6 HOURS PRN
Status: DISCONTINUED | OUTPATIENT
Start: 2024-09-04 | End: 2024-09-05 | Stop reason: HOSPADM

## 2024-09-04 RX ORDER — MISOPROSTOL 100 UG/1
800 TABLET ORAL ONCE AS NEEDED
Status: DISCONTINUED | OUTPATIENT
Start: 2024-09-04 | End: 2024-09-05 | Stop reason: HOSPADM

## 2024-09-04 RX ORDER — PROCHLORPERAZINE EDISYLATE 5 MG/ML
5 INJECTION INTRAMUSCULAR; INTRAVENOUS EVERY 6 HOURS PRN
Status: DISCONTINUED | OUTPATIENT
Start: 2024-09-04 | End: 2024-09-04

## 2024-09-04 RX ORDER — CARBOPROST TROMETHAMINE 250 UG/ML
250 INJECTION, SOLUTION INTRAMUSCULAR
Status: DISCONTINUED | OUTPATIENT
Start: 2024-09-04 | End: 2024-09-05

## 2024-09-04 RX ORDER — BUTORPHANOL TARTRATE 2 MG/ML
1 INJECTION INTRAMUSCULAR; INTRAVENOUS
Status: DISCONTINUED | OUTPATIENT
Start: 2024-09-04 | End: 2024-09-04

## 2024-09-04 RX ORDER — CARBOPROST TROMETHAMINE 250 UG/ML
250 INJECTION, SOLUTION INTRAMUSCULAR
Status: DISCONTINUED | OUTPATIENT
Start: 2024-09-04 | End: 2024-09-05 | Stop reason: HOSPADM

## 2024-09-04 RX ORDER — MISOPROSTOL 100 UG/1
800 TABLET ORAL ONCE AS NEEDED
Status: DISCONTINUED | OUTPATIENT
Start: 2024-09-04 | End: 2024-09-05

## 2024-09-04 RX ORDER — OXYTOCIN-SODIUM CHLORIDE 0.9% IV SOLN 30 UNIT/500ML 30-0.9/5 UT/ML-%
10 SOLUTION INTRAVENOUS ONCE AS NEEDED
Status: DISCONTINUED | OUTPATIENT
Start: 2024-09-04 | End: 2024-09-05

## 2024-09-04 RX ORDER — DIPHENOXYLATE HYDROCHLORIDE AND ATROPINE SULFATE 2.5; .025 MG/1; MG/1
2 TABLET ORAL EVERY 6 HOURS PRN
Status: DISCONTINUED | OUTPATIENT
Start: 2024-09-04 | End: 2024-09-05 | Stop reason: HOSPADM

## 2024-09-04 RX ORDER — HYDROCODONE BITARTRATE AND ACETAMINOPHEN 7.5; 325 MG/1; MG/1
1 TABLET ORAL EVERY 4 HOURS PRN
Status: DISCONTINUED | OUTPATIENT
Start: 2024-09-04 | End: 2024-09-05 | Stop reason: HOSPADM

## 2024-09-04 RX ORDER — SODIUM CHLORIDE 0.9 % (FLUSH) 0.9 %
3 SYRINGE (ML) INJECTION
Status: DISCONTINUED | OUTPATIENT
Start: 2024-09-04 | End: 2024-09-05 | Stop reason: HOSPADM

## 2024-09-04 RX ORDER — SIMETHICONE 80 MG
1 TABLET,CHEWABLE ORAL 4 TIMES DAILY PRN
Status: DISCONTINUED | OUTPATIENT
Start: 2024-09-04 | End: 2024-09-04

## 2024-09-04 RX ORDER — OXYTOCIN-SODIUM CHLORIDE 0.9% IV SOLN 30 UNIT/500ML 30-0.9/5 UT/ML-%
10 SOLUTION INTRAVENOUS ONCE AS NEEDED
Status: DISCONTINUED | OUTPATIENT
Start: 2024-09-04 | End: 2024-09-05 | Stop reason: HOSPADM

## 2024-09-04 RX ORDER — METHYLERGONOVINE MALEATE 0.2 MG/ML
200 INJECTION INTRAVENOUS ONCE AS NEEDED
Status: DISCONTINUED | OUTPATIENT
Start: 2024-09-04 | End: 2024-09-05 | Stop reason: HOSPADM

## 2024-09-04 RX ORDER — BISACODYL 10 MG/1
10 SUPPOSITORY RECTAL DAILY PRN
Status: DISCONTINUED | OUTPATIENT
Start: 2024-09-04 | End: 2024-09-05 | Stop reason: HOSPADM

## 2024-09-04 RX ORDER — ROPIVACAINE HYDROCHLORIDE 2 MG/ML
INJECTION, SOLUTION EPIDURAL; INFILTRATION; PERINEURAL
Status: COMPLETED
Start: 2024-09-04 | End: 2024-09-04

## 2024-09-04 RX ORDER — METHYLERGONOVINE MALEATE 0.2 MG/ML
200 INJECTION INTRAVENOUS ONCE AS NEEDED
Status: DISCONTINUED | OUTPATIENT
Start: 2024-09-04 | End: 2024-09-05

## 2024-09-04 RX ORDER — TRANEXAMIC ACID 10 MG/ML
1000 INJECTION, SOLUTION INTRAVENOUS EVERY 30 MIN PRN
Status: DISCONTINUED | OUTPATIENT
Start: 2024-09-04 | End: 2024-09-05 | Stop reason: HOSPADM

## 2024-09-04 RX ORDER — LIDOCAINE HCL/EPINEPHRINE/PF 2%-1:200K
VIAL (ML) INJECTION
Status: DISCONTINUED | OUTPATIENT
Start: 2024-09-04 | End: 2024-09-04

## 2024-09-04 RX ORDER — PRENATAL WITH FERROUS FUM AND FOLIC ACID 3080; 920; 120; 400; 22; 1.84; 3; 20; 10; 1; 12; 200; 27; 25; 2 [IU]/1; [IU]/1; MG/1; [IU]/1; MG/1; MG/1; MG/1; MG/1; MG/1; MG/1; UG/1; MG/1; MG/1; MG/1; MG/1
1 TABLET ORAL DAILY
Status: DISCONTINUED | OUTPATIENT
Start: 2024-09-05 | End: 2024-09-05 | Stop reason: HOSPADM

## 2024-09-04 RX ORDER — LANOLIN ALCOHOL/MO/W.PET/CERES
1 CREAM (GRAM) TOPICAL DAILY
Status: DISCONTINUED | OUTPATIENT
Start: 2024-09-05 | End: 2024-09-05 | Stop reason: HOSPADM

## 2024-09-04 RX ORDER — ADHESIVE BANDAGE
30 BANDAGE TOPICAL NIGHTLY PRN
Status: DISCONTINUED | OUTPATIENT
Start: 2024-09-04 | End: 2024-09-05 | Stop reason: HOSPADM

## 2024-09-04 RX ORDER — SODIUM CHLORIDE, SODIUM LACTATE, POTASSIUM CHLORIDE, CALCIUM CHLORIDE 600; 310; 30; 20 MG/100ML; MG/100ML; MG/100ML; MG/100ML
INJECTION, SOLUTION INTRAVENOUS CONTINUOUS
Status: DISCONTINUED | OUTPATIENT
Start: 2024-09-04 | End: 2024-09-05

## 2024-09-04 RX ORDER — OXYTOCIN 10 [USP'U]/ML
10 INJECTION, SOLUTION INTRAMUSCULAR; INTRAVENOUS ONCE AS NEEDED
Status: DISCONTINUED | OUTPATIENT
Start: 2024-09-04 | End: 2024-09-05 | Stop reason: HOSPADM

## 2024-09-04 RX ORDER — BUTORPHANOL TARTRATE 2 MG/ML
2 INJECTION INTRAMUSCULAR; INTRAVENOUS
Status: DISCONTINUED | OUTPATIENT
Start: 2024-09-04 | End: 2024-09-04

## 2024-09-04 RX ORDER — CEFAZOLIN SODIUM 2 G/50ML
2 SOLUTION INTRAVENOUS ONCE AS NEEDED
Status: DISCONTINUED | OUTPATIENT
Start: 2024-09-04 | End: 2024-09-05

## 2024-09-04 RX ORDER — ACETAMINOPHEN 325 MG/1
650 TABLET ORAL EVERY 6 HOURS PRN
Status: DISCONTINUED | OUTPATIENT
Start: 2024-09-04 | End: 2024-09-04

## 2024-09-04 RX ORDER — MISOPROSTOL 100 UG/1
400 TABLET ORAL
Status: DISCONTINUED | OUTPATIENT
Start: 2024-09-04 | End: 2024-09-05

## 2024-09-04 RX ORDER — DOCUSATE SODIUM 100 MG/1
200 CAPSULE, LIQUID FILLED ORAL 2 TIMES DAILY PRN
Status: DISCONTINUED | OUTPATIENT
Start: 2024-09-04 | End: 2024-09-05 | Stop reason: HOSPADM

## 2024-09-04 RX ORDER — OXYTOCIN-SODIUM CHLORIDE 0.9% IV SOLN 30 UNIT/500ML 30-0.9/5 UT/ML-%
95 SOLUTION INTRAVENOUS ONCE
Status: COMPLETED | OUTPATIENT
Start: 2024-09-04 | End: 2024-09-04

## 2024-09-04 RX ORDER — DIPHENHYDRAMINE HCL 25 MG
25 CAPSULE ORAL EVERY 4 HOURS PRN
Status: DISCONTINUED | OUTPATIENT
Start: 2024-09-04 | End: 2024-09-05 | Stop reason: HOSPADM

## 2024-09-04 RX ORDER — OXYTOCIN 10 [USP'U]/ML
10 INJECTION, SOLUTION INTRAMUSCULAR; INTRAVENOUS ONCE AS NEEDED
Status: DISCONTINUED | OUTPATIENT
Start: 2024-09-04 | End: 2024-09-05

## 2024-09-04 RX ORDER — MISOPROSTOL 100 UG/1
400 TABLET ORAL EVERY 4 HOURS PRN
Status: DISCONTINUED | OUTPATIENT
Start: 2024-09-04 | End: 2024-09-04

## 2024-09-04 RX ORDER — HYDROCORTISONE 25 MG/G
CREAM TOPICAL 3 TIMES DAILY PRN
Status: DISCONTINUED | OUTPATIENT
Start: 2024-09-04 | End: 2024-09-05 | Stop reason: HOSPADM

## 2024-09-04 RX ORDER — OXYTOCIN-SODIUM CHLORIDE 0.9% IV SOLN 30 UNIT/500ML 30-0.9/5 UT/ML-%
95 SOLUTION INTRAVENOUS ONCE AS NEEDED
Status: DISCONTINUED | OUTPATIENT
Start: 2024-09-04 | End: 2024-09-05

## 2024-09-04 RX ORDER — OXYTOCIN-SODIUM CHLORIDE 0.9% IV SOLN 30 UNIT/500ML 30-0.9/5 UT/ML-%
95 SOLUTION INTRAVENOUS ONCE AS NEEDED
Status: DISCONTINUED | OUTPATIENT
Start: 2024-09-04 | End: 2024-09-05 | Stop reason: HOSPADM

## 2024-09-04 RX ORDER — CALCIUM CARBONATE 200(500)MG
500 TABLET,CHEWABLE ORAL 3 TIMES DAILY PRN
Status: DISCONTINUED | OUTPATIENT
Start: 2024-09-04 | End: 2024-09-05

## 2024-09-04 RX ORDER — LIDOCAINE HCL/EPINEPHRINE/PF 2%-1:200K
VIAL (ML) INJECTION
Status: COMPLETED
Start: 2024-09-04 | End: 2024-09-04

## 2024-09-04 RX ORDER — MUPIROCIN 20 MG/G
OINTMENT TOPICAL
Status: DISCONTINUED | OUTPATIENT
Start: 2024-09-04 | End: 2024-09-05

## 2024-09-04 RX ORDER — DIPHENOXYLATE HYDROCHLORIDE AND ATROPINE SULFATE 2.5; .025 MG/1; MG/1
2 TABLET ORAL EVERY 6 HOURS PRN
Status: DISCONTINUED | OUTPATIENT
Start: 2024-09-04 | End: 2024-09-04

## 2024-09-04 RX ORDER — HYDROMORPHONE HYDROCHLORIDE 1 MG/ML
1 INJECTION, SOLUTION INTRAMUSCULAR; INTRAVENOUS; SUBCUTANEOUS
Status: DISCONTINUED | OUTPATIENT
Start: 2024-09-04 | End: 2024-09-04

## 2024-09-04 RX ORDER — SODIUM CHLORIDE 9 MG/ML
INJECTION, SOLUTION INTRAVENOUS
Status: DISCONTINUED | OUTPATIENT
Start: 2024-09-04 | End: 2024-09-05

## 2024-09-04 RX ORDER — TRANEXAMIC ACID 10 MG/ML
1000 INJECTION, SOLUTION INTRAVENOUS EVERY 30 MIN PRN
Status: DISCONTINUED | OUTPATIENT
Start: 2024-09-04 | End: 2024-09-05

## 2024-09-04 RX ORDER — MISOPROSTOL 100 UG/1
800 TABLET ORAL ONCE
Status: COMPLETED | OUTPATIENT
Start: 2024-09-04 | End: 2024-09-04

## 2024-09-04 RX ORDER — LIDOCAINE HCL/EPINEPHRINE/PF 2%-1:200K
1 VIAL (ML) INJECTION ONCE
Status: DISCONTINUED | OUTPATIENT
Start: 2024-09-04 | End: 2024-09-05

## 2024-09-04 RX ORDER — OXYTOCIN-SODIUM CHLORIDE 0.9% IV SOLN 30 UNIT/500ML 30-0.9/5 UT/ML-%
10 SOLUTION INTRAVENOUS ONCE
Status: DISCONTINUED | OUTPATIENT
Start: 2024-09-04 | End: 2024-09-05

## 2024-09-04 RX ORDER — ROPIVACAINE HYDROCHLORIDE 2 MG/ML
INJECTION, SOLUTION EPIDURAL; INFILTRATION CONTINUOUS PRN
Status: DISCONTINUED | OUTPATIENT
Start: 2024-09-04 | End: 2024-09-04

## 2024-09-04 RX ORDER — ROPIVACAINE HYDROCHLORIDE 2 MG/ML
12 INJECTION, SOLUTION EPIDURAL; INFILTRATION CONTINUOUS
Status: DISCONTINUED | OUTPATIENT
Start: 2024-09-04 | End: 2024-09-05

## 2024-09-04 RX ORDER — IBUPROFEN 600 MG/1
600 TABLET ORAL EVERY 6 HOURS PRN
Status: DISCONTINUED | OUTPATIENT
Start: 2024-09-04 | End: 2024-09-05 | Stop reason: HOSPADM

## 2024-09-04 RX ADMIN — SODIUM CHLORIDE, POTASSIUM CHLORIDE, SODIUM LACTATE AND CALCIUM CHLORIDE: 600; 310; 30; 20 INJECTION, SOLUTION INTRAVENOUS at 04:09

## 2024-09-04 RX ADMIN — ROPIVACAINE HYDROCHLORIDE 12 ML/HR: 2 INJECTION, SOLUTION EPIDURAL; INFILTRATION at 04:09

## 2024-09-04 RX ADMIN — LIDOCAINE HYDROCHLORIDE AND EPINEPHRINE 3 ML: 15; 5 INJECTION, SOLUTION EPIDURAL at 04:09

## 2024-09-04 RX ADMIN — Medication 95 MILLI-UNITS/MIN: at 07:09

## 2024-09-04 RX ADMIN — IBUPROFEN 600 MG: 600 TABLET, FILM COATED ORAL at 07:09

## 2024-09-04 RX ADMIN — LIDOCAINE HYDROCHLORIDE,EPINEPHRINE BITARTRATE 3 ML: 20; .005 INJECTION, SOLUTION EPIDURAL; INFILTRATION; INTRACAUDAL; PERINEURAL at 04:09

## 2024-09-04 RX ADMIN — ACETAMINOPHEN 650 MG: 325 TABLET, FILM COATED ORAL at 04:09

## 2024-09-04 RX ADMIN — LIDOCAINE HYDROCHLORIDE AND EPINEPHRINE 2 ML: 15; 5 INJECTION, SOLUTION EPIDURAL at 04:09

## 2024-09-04 RX ADMIN — MISOPROSTOL 400 MCG: 100 TABLET ORAL at 03:09

## 2024-09-04 RX ADMIN — MISOPROSTOL 400 MCG: 100 TABLET ORAL at 12:09

## 2024-09-04 RX ADMIN — MISOPROSTOL 800 MCG: 100 TABLET ORAL at 08:09

## 2024-09-04 RX ADMIN — HYDROMORPHONE HYDROCHLORIDE 1 MG: 1 INJECTION, SOLUTION INTRAMUSCULAR; INTRAVENOUS; SUBCUTANEOUS at 04:09

## 2024-09-04 RX ADMIN — SODIUM CHLORIDE: 9 INJECTION, SOLUTION INTRAVENOUS at 07:09

## 2024-09-04 NOTE — L&D DELIVERY NOTE
[facility]-Labor & Delivery  Vaginal Delivery Note      Pre Operative Diagnosis: 23w0d   Patient Active Problem List   Diagnosis    Fetal demise, greater than 22 weeks, antepartum    22 weeks gestation of pregnancy       Post Operative Diagnosis: same  Procedure: Spontaneous vaginal delivery  Provider: Marah Joiner MD  Anesthisa: epidural  EBL: 150  cc  Complications: none    Indications:  Katerin Chen is a 24 y.o.  female with IUP at 23w0d  who is being admitted for fetal demise.       Procedure in detail: Patient found to be complete.  Anesthesia was found to be adequate.  Patient was allowed to Valsalva.  Fetus delivered in breech presentation. Diffuse chest and head edema noted. Placenta delivered spontaneous intact with a three-vessel cord.  Following this cervix and vagina were examined for lacerations, none were found.  Excellent hemostasis was achieved with IV Pitocin and uterine massage.    Infant was placed on mother's chest for skin to skin.     Sponge needle and lap counted correctly x2.       Infant delivery information unavailable at time of this note. See nursing note.       Placenta to path.    Patient unsure on genetic testing.

## 2024-09-04 NOTE — ANESTHESIA PREPROCEDURE EVALUATION
09/04/2024  Katerin Chen is a 24 y.o., female.      Pre-op Assessment    I have reviewed the Patient Summary Reports.     I have reviewed the Nursing Notes. I have reviewed the NPO Status.   I have reviewed the Medications.     Review of Systems  Anesthesia Hx:  No problems with previous Anesthesia             Denies Family Hx of Anesthesia complications.    Denies Personal Hx of Anesthesia complications.                    Social:  Non-Smoker       Hematology/Oncology:    Oncology Normal    -- Anemia:                                  EENT/Dental:  EENT/Dental Normal           Cardiovascular:  Cardiovascular Normal Exercise tolerance: good                                           Pulmonary:  Pulmonary Normal                       Renal/:  Renal/ Normal                 Hepatic/GI:  Hepatic/GI Normal                 Musculoskeletal:  Musculoskeletal Normal                Neurological:  Neurology Normal                                      Endocrine:  Endocrine Normal          Obesity / BMI > 30  Dermatological:  Skin Normal    Psych:  Psychiatric History anxiety depression ADD ADHD               Physical Exam  General: Well nourished, Cooperative, Alert and Oriented    Airway:  Mallampati: II / II  Mouth Opening: Normal  TM Distance: Normal  Tongue: Normal  Neck ROM: Normal ROM    Dental:  Intact        Anesthesia Plan  Type of Anesthesia, risks & benefits discussed:    Anesthesia Type: Epidural  Intra-op Monitoring Plan: Standard ASA Monitors  Post Op Pain Control Plan: multimodal analgesia  Induction:  IV  Airway Plan: Direct  Informed Consent: Informed consent signed with the Patient and all parties understand the risks and agree with anesthesia plan.  All questions answered. Patient consented to blood products? Yes  ASA Score: 2  Day of Surgery Review of History & Physical: H&P Update referred to  the surgeon/provider.I have interviewed and examined the patient. I have reviewed the patient's H&P dated: There are no significant changes.     Ready For Surgery From Anesthesia Perspective.     .

## 2024-09-04 NOTE — ANESTHESIA PROCEDURE NOTES
Epidural    Patient location during procedure: OB   Reason for block: primary anesthetic   Reason for block: at surgeon's request, post-op pain management  Diagnosis: Fetal demise   Start time: 9/4/2024 4:28 PM  Timeout: 9/4/2024 4:20 PM  End time: 9/4/2024 4:35 PM    Staffing  Performing Provider: Harsh Polanco CRNA  Authorizing Provider: Harsh Polanco CRNA    Staffing  Performed by: Harsh Polanco CRNA  Authorized by: Harsh Polanco CRNA        Preanesthetic Checklist  Completed: patient identified, IV checked, site marked, risks and benefits discussed, surgical consent, monitors and equipment checked, pre-op evaluation, timeout performed, anesthesia consent given, hand hygiene performed and patient being monitored  Preparation  Patient position: sitting  Prep: ChloraPrep  Patient monitoring: Pulse Ox and Blood Pressure  Reason for block: primary anesthetic   Epidural  Skin Anesthetic: lidocaine 1%  Skin Wheal: 3 mL  Administration type: continuous  Approach: midline  Interspace: L3-4    Block type: caudal.  Needle and Epidural Catheter  Needle type: Tuohy   Needle gauge: 18  Needle length: 3.5 inches  Needle insertion depth: 4.5 cm  Catheter type: multi-orifice  Catheter size: 20 G  Catheter at skin depth: 5 cm  Insertion Attempts: 1  Test dose: 3 mL of lidocaine 1.5% with Epi 1-to-200,000  Additional Documentation: incremental injection, negative aspiration for heme and CSF and no paresthesia on injection  Needle localization: anatomical landmarks  Assessment  Ease of block: easy  Patient's tolerance of the procedure: comfortable throughout block No inadvertent dural puncture with Tuohy.  Dural puncture not performed with spinal needle

## 2024-09-04 NOTE — INTERVAL H&P NOTE
The patient has been examined and the H&P has been reviewed:    I concur with the findings and no changes have occurred since H&P was written.    No FHT confirmed again on RTS at OA this AM, breech     Active Hospital Problems    Diagnosis  POA    *Fetal demise, greater than 22 weeks, antepartum [O36.4XX0]  Yes    22 weeks gestation of pregnancy [Z3A.22]  Not Applicable      Resolved Hospital Problems   No resolved problems to display.     Admit  TOCO only  ASVD  Epidural if desires   I am aware of the plan of care prior to the administration of CRNA anesthesia services.    Fetal demise lab orderset

## 2024-09-05 VITALS
HEART RATE: 67 BPM | SYSTOLIC BLOOD PRESSURE: 99 MMHG | TEMPERATURE: 98 F | OXYGEN SATURATION: 99 % | DIASTOLIC BLOOD PRESSURE: 54 MMHG | RESPIRATION RATE: 18 BRPM

## 2024-09-05 LAB
AT III ACT/NOR PPP CHRO: 79 % (ref 80–130)
BASOPHILS # BLD AUTO: 0.03 X10(3)/MCL (ref 0.01–0.08)
BASOPHILS NFR BLD AUTO: 0.3 % (ref 0.1–1.2)
EOSINOPHIL # BLD AUTO: 0.08 X10(3)/MCL (ref 0.04–0.36)
EOSINOPHIL NFR BLD AUTO: 0.7 % (ref 0.7–7)
ERYTHROCYTE [DISTWIDTH] IN BLOOD BY AUTOMATED COUNT: 12 % (ref 11–14.5)
HCT VFR BLD AUTO: 34.8 % (ref 36–48)
HCT VFR BLD AUTO: 37.3 % (ref 36–48)
HGB BLD-MCNC: 12.2 G/DL (ref 11.8–16)
HGB BLD-MCNC: 13.2 G/DL (ref 11.8–16)
IMM GRANULOCYTES # BLD AUTO: 0.04 X10(3)/MCL (ref 0–0.03)
IMM GRANULOCYTES NFR BLD AUTO: 0.4 % (ref 0–0.5)
LYMPHOCYTES # BLD AUTO: 2.99 X10(3)/MCL (ref 1.16–3.74)
LYMPHOCYTES NFR BLD AUTO: 27.1 % (ref 20–55)
MCH RBC QN AUTO: 32.7 PG (ref 27–34)
MCHC RBC AUTO-ENTMCNC: 35.1 G/DL (ref 31–37)
MCV RBC AUTO: 93.3 FL (ref 79–99)
MIXING STUDIES PPP-IMP: NORMAL
MONOCYTES # BLD AUTO: 1.03 X10(3)/MCL (ref 0.24–0.36)
MONOCYTES NFR BLD AUTO: 9.3 % (ref 4.7–12.5)
NEUTROPHILS # BLD AUTO: 6.87 X10(3)/MCL (ref 1.56–6.13)
NEUTROPHILS NFR BLD AUTO: 62.2 % (ref 37–73)
NRBC BLD AUTO-RTO: 0 %
PLATELET # BLD AUTO: 167 X10(3)/MCL (ref 140–371)
PMV BLD AUTO: 10.9 FL (ref 9.4–12.4)
RBC # BLD AUTO: 3.73 X10(6)/MCL (ref 4–5.1)
SCREEN DRVVT/NORMAL: 0.87 RATIO
WBC # BLD AUTO: 11.04 X10(3)/MCL (ref 4–11.5)

## 2024-09-05 PROCEDURE — 36415 COLL VENOUS BLD VENIPUNCTURE: CPT | Performed by: OBSTETRICS & GYNECOLOGY

## 2024-09-05 PROCEDURE — 85025 COMPLETE CBC W/AUTO DIFF WBC: CPT | Performed by: OBSTETRICS & GYNECOLOGY

## 2024-09-05 PROCEDURE — 25000003 PHARM REV CODE 250: Performed by: OBSTETRICS & GYNECOLOGY

## 2024-09-05 PROCEDURE — 85018 HEMOGLOBIN: CPT | Performed by: OBSTETRICS & GYNECOLOGY

## 2024-09-05 RX ORDER — TRIPROLIDINE/PSEUDOEPHEDRINE 2.5MG-60MG
30 TABLET ORAL EVERY 6 HOURS PRN
Qty: 480 ML | Refills: 2 | Status: SHIPPED | OUTPATIENT
Start: 2024-09-05

## 2024-09-05 RX ADMIN — FERROUS SULFATE TAB 325 MG (65 MG ELEMENTAL FE) 1 EACH: 325 (65 FE) TAB at 08:09

## 2024-09-05 RX ADMIN — BENZOCAINE AND LEVOMENTHOL: 200; 5 SPRAY TOPICAL at 01:09

## 2024-09-05 RX ADMIN — PRENATAL VITAMINS-IRON FUMARATE 27 MG IRON-FOLIC ACID 0.8 MG TABLET 1 TABLET: at 08:09

## 2024-09-05 NOTE — DISCHARGE SUMMARY
Delivery Discharge Summary  Obstetrics        Discharge Provider: KEYLA RUTLEDGE    Admission date: 2024  Discharge date: 2024    Admit Dx:   Patient Active Problem List   Diagnosis    Fetal demise, greater than 22 weeks, antepartum    22 weeks gestation of pregnancy     Discharge Dx:    Patient Active Problem List   Diagnosis    Fetal demise, greater than 22 weeks, antepartum    22 weeks gestation of pregnancy             Hospital Course:  Katerin Chen is a 24 y.o. now  who was admitted on 2024 for delivery d/t fetal demise at 23w0d. Patient delivered a nonviable . Please see delivery note for further details. Pt was in stable condition post delivery.  Her postpartum course was uncomplicated. On the date of discharge, patient's pain is controlled with oral pain medications. She is tolerating ambulation without SOB or CP, and PO diet without N/V. Reported lochia is within the normal range. Pt in stable condition and ready for discharge.       DISCHARGE PHYSICAL EXAM  Temp:  [97.5 °F (36.4 °C)-100.4 °F (38 °C)] 97.9 °F (36.6 °C)  Pulse:  [] 59  Resp:  [17-18] 17  BP: ()/(47-68) 107/66    Intake/Output Summary (Last 24 hours) at 2024 0803  Last data filed at 2024 1903  Gross per 24 hour   Intake --   Output 75 ml   Net -75 ml     There is no height or weight on file to calculate BMI.    General: no acute distress  Abdomen: soft, non-tender, non-distended; Fundus firm periumbilical    Extremities: non-tender, symmetric, trace BLE edema, neg Joy's Bilateral     Pertinent studies:  Lab Results   Component Value Date/Time    GROUPTRH A POS 2024 07:44 AM    ABORH A POS 07/10/2024 10:50 AM    ABSCREEN NEG 07/10/2024 10:50 AM     Recent Results (from the past 336 hour(s))   CBC with Differential    Collection Time: 24  6:00 AM   Result Value Ref Range    WBC 11.04 4.00 - 11.50 x10(3)/mcL    Hgb 12.2 11.8 - 16.0 g/dL    Hct 34.8 (L) 36.0 - 48.0 %     Platelet 167 140 - 371 x10(3)/mcL   CBC with Differential    Collection Time: 09/04/24  7:35 AM   Result Value Ref Range    WBC 10.23 4.00 - 11.50 x10(3)/mcL    Hgb 12.9 11.8 - 16.0 g/dL    Hct 36.6 36.0 - 48.0 %    Platelet 177 140 - 371 x10(3)/mcL         Disposition: To home, self care    Follow Up: 1 week for mood check and 6 weeks for PP visit with Dr. Joiner    Patient Instructions:   1. Call the office for any bleeding >2 pads/hour for >2 hours, temperature >100.4, pain that is uncontrolled with medications, or for any other concerns.  2. Pre Eclampsia precautions  3. No driving while on narcotics.          MERCEDES RAMIRES

## 2024-09-05 NOTE — ANESTHESIA POSTPROCEDURE EVALUATION
Anesthesia Post Evaluation    Patient: Katerin Chen    Procedure(s) Performed: * No procedures listed *    Final Anesthesia Type: epidural      Patient location during evaluation: labor & delivery  Patient participation: Yes- Able to Participate  Level of consciousness: awake and alert, awake and oriented  Post-procedure vital signs: reviewed and stable  Pain management: adequate  Airway patency: patent    PONV status at discharge: No PONV  Anesthetic complications: no      Cardiovascular status: blood pressure returned to baseline  Respiratory status: unassisted, room air and spontaneous ventilation  Hydration status: euvolemic  Follow-up not needed.              Vitals Value Taken Time   BP 94/52 09/04/24 1910   Temp 38 °C (100.4 °F) 09/04/24 1606   Pulse 78 09/04/24 1910   Resp 18 09/04/24 1601   SpO2 99 % 09/04/24 0800   Vitals shown include unfiled device data.      No case tracking events are documented in the log.      Pain/Tino Score: Pain Rating Prior to Med Admin: 4 (9/4/2024  4:06 PM)

## 2024-09-05 NOTE — PLAN OF CARE
24 0946   OB SCREEN   Assessment Type Discharge Planning Assessment   Source of Information patient   Received Prenatal Care Yes   Any indications/suspicions for None   Is this a teen pregnancy No   Is the baby in NICU No   Indication for adoption/Safe Haven No   Indication for DME/post-acute needs No   HIV (+) No   Any congenital  disorders No   Fetal demise/ death Yes   Physical Activity   On average, how many days per week do you engage in moderate to strenuous exercise (like a brisk walk)? 0 days   On average, how many minutes do you engage in exercise at this level? 0 min   Financial Resource Strain   How hard is it for you to pay for the very basics like food, housing, medical care, and heating? Not very   Housing Stability   In the last 12 months, was there a time when you were not able to pay the mortgage or rent on time? N   At any time in the past 12 months, were you homeless or living in a shelter (including now)? N   Transportation Needs   Has the lack of transportation kept you from medical appointments, meetings, work or from getting things needed for daily living? No   Food Insecurity   Within the past 12 months, you worried that your food would run out before you got the money to buy more. Never true   Within the past 12 months, the food you bought just didn't last and you didn't have money to get more. Never true   Stress   Do you feel stress - tense, restless, nervous, or anxious, or unable to sleep at night because your mind is troubled all the time - these days? To some exte   Social Isolation   How often do you feel lonely or isolated from those around you?  Never   Alcohol Use   Q1: How often do you have a drink containing alcohol? Never   Q2: How many drinks containing alcohol do you have on a typical day when you are drinking? None   Q3: How often do you have six or more drinks on one occasion? Never   Utilities   In the past 12 months has the electric, gas, oil, or  water company threatened to shut off services in your home? No   Health Literacy   How often do you need to have someone help you when you read instructions, pamphlets, or other written material from your doctor or pharmacy? Never     Spoke with patient regarding [facility]. Handouts given. Patient stated she would like referral to [email or web address]

## 2024-09-05 NOTE — ANESTHESIA POSTPROCEDURE EVALUATION
Anesthesia Post Evaluation    Patient: Katerin Chen    Procedure(s) Performed: * No procedures listed *    Final Anesthesia Type: epidural      Patient location during evaluation: labor & delivery  Patient participation: Yes- Able to Participate  Level of consciousness: awake and alert, awake and oriented  Post-procedure vital signs: reviewed and stable  Pain management: adequate  Airway patency: patent    PONV status at discharge: No PONV  Anesthetic complications: no      Cardiovascular status: blood pressure returned to baseline  Respiratory status: unassisted, room air and spontaneous ventilation  Hydration status: euvolemic  Follow-up not needed.              Vitals Value Taken Time   BP 94/52 09/04/24 1910   Temp 38 °C (100.4 °F) 09/04/24 1606   Pulse 78 09/04/24 1910   Resp 18 09/04/24 1601   SpO2 99 % 09/04/24 0800         No case tracking events are documented in the log.      Pain/Tino Score: Pain Rating Prior to Med Admin: 4 (9/4/2024  4:06 PM)           No abnormalities noted

## 2024-09-06 LAB
B19V IGG SER QL IA: NEGATIVE
B19V IGG+IGM SER-IMP: NORMAL
B19V IGM SER QL IA: NEGATIVE
B2 GLYCOPROT1 IGG SERPL IA-ACNC: <9.4 SGU
B2 GLYCOPROT1 IGM SERPL IA-ACNC: <9.4 SMU
CARDIOLIPIN IGA QUANT (OHS): 1.4 APL U/ML
CARDIOLIPIN IGG QUANT (OHS): 0.6 GPL U/ML
CARDIOLIPIN IGM QUANT (OHS): <0.9 MPL U/ML
CMV IGG SERPL QL IA: NEGATIVE

## 2024-09-10 NOTE — PROGRESS NOTES
Chief Complaint:  Mood Check     History of Present Illness:  Patient is a 24 y.o.  s/p  24 presents for mood check following  of  IUFD @23wks. Appropriate grieving. Has  services this week. Denies HI/SI. Able to care for self and child. Great family support. Lochia mild. Denies breat engorgement. States small amount of lactation.        Review of systems:  General/Constitutional: Chills denies. Fatigue/weakness denies. Fever denies. Night sweats denies. Hot flashes denies  Breast: Dimpling denies. Breast mass denies. Breast pain/tenderness denies. Nipple discharge denies. Puckering denies.  Gastrointestinal: Abdominal pain denies. Blood in stool denies. Constipation denies. Diarrhea denies. Heartburn denies. Nausea denies. Vomiting denies   Genitourinary: Incontinence denies. Blood in urine denies. Frequent urination denies. Urgency denies. Painful urination denies. Nocturia denies   Gynecologic: Irregular menses denies. Heavy bleeding denies. Painful menses denies. Vaginal discharge denies. Vaginal odor denies. Vaginal itching/Irritation denies. Vaginal lesion denies.  Pelvic pain denies. Decreased libido denies. Vulvar lesion denies. Prolapse of genital organs denies. Painful intercourse denies. Postcoital bleeding denies   Psychiatric: Mood lability denies. Depressed mood denies. Suicidal thoughts denies. Anxiety denies. Overwhelmed denies. Appetite normal. Energy level normal.     OB History    Para Term  AB Living   3 2 1 1 1 1   SAB IAB Ectopic Multiple Live Births   1 0 0 0 1      # 1 - Date: 2021, Sex: None, Weight: None, GA: 7w0d, Type: Spontaneous , Apgar1: None, Apgar5: None, Living: Fetal Demise, Birth Comments: None    # 2 - Date: 21, Sex: Female, Weight: 3.345 kg (7 lb 6 oz), GA: 38w4d, Type: Vaginal, Spontaneous, Apgar1: None, Apgar5: None, Living: Living, Birth Comments: None    # 3 - Date: 23, Sex: Female, Weight: 0.215 kg (7.6 oz), GA:  "23w0d, Type: Vaginal, Spontaneous, Apgar1: 0, Apgar5: 0, Living: Fetal Demise, Birth Comments: None      Past Medical History:   Diagnosis Date    ADHD     Anxiety disorder, unspecified     Mental disorder      No current outpatient medications on file.      Physical Exam:  /68   Temp 97.2 °F (36.2 °C)   Ht 5' 3.5" (1.613 m)   Wt 80.9 kg (178 lb 6.4 oz)   LMP 03/27/2024   BMI 31.11 kg/m²     Constitutional: General appearance: healthy, well-nourished and well-developed   Psychiatric:  Orientation to time, place and person. Normal mood and affect and active, alert       Assessment/Plan:  1. IUFD at 20 weeks or more of gestation  Educated  Counseling information given for [facility] with [facility] [phone]  HI & SI precautions  Appropriate grieving  Great family support  Declines medication  Keep postpartum exam  Reviewed labs and path with pt     Chromosomal microarray: PENDING    IUFD labs normal( TSH, A2c, CMV, Parvo)  APS: neg  Placenta: benign     This note was transcribed by Ofe Chavarria MA. There may be transcription errors as a result, however minimal. I agree with transcription and every effort has been made to ensure accuracy of transcription, but any obvious errors or omissions should be clarified with the author of the document.          "

## 2024-09-12 ENCOUNTER — OFFICE VISIT (OUTPATIENT)
Dept: OBSTETRICS AND GYNECOLOGY | Facility: CLINIC | Age: 24
End: 2024-09-12
Payer: MEDICAID

## 2024-09-12 VITALS
BODY MASS INDEX: 30.45 KG/M2 | TEMPERATURE: 97 F | SYSTOLIC BLOOD PRESSURE: 118 MMHG | WEIGHT: 178.38 LBS | HEIGHT: 64 IN | DIASTOLIC BLOOD PRESSURE: 68 MMHG

## 2024-09-12 DIAGNOSIS — O36.4XX0 IUFD AT 20 WEEKS OR MORE OF GESTATION: Primary | ICD-10-CM

## 2024-09-12 PROCEDURE — 1159F MED LIST DOCD IN RCRD: CPT | Mod: CPTII,,, | Performed by: OBSTETRICS & GYNECOLOGY

## 2024-09-12 PROCEDURE — 3044F HG A1C LEVEL LT 7.0%: CPT | Mod: CPTII,,, | Performed by: OBSTETRICS & GYNECOLOGY

## 2024-09-12 PROCEDURE — 99214 OFFICE O/P EST MOD 30 MIN: CPT | Mod: TH,,, | Performed by: OBSTETRICS & GYNECOLOGY

## 2024-09-12 PROCEDURE — 3078F DIAST BP <80 MM HG: CPT | Mod: CPTII,,, | Performed by: OBSTETRICS & GYNECOLOGY

## 2024-09-12 PROCEDURE — 3008F BODY MASS INDEX DOCD: CPT | Mod: CPTII,,, | Performed by: OBSTETRICS & GYNECOLOGY

## 2024-09-12 PROCEDURE — 3074F SYST BP LT 130 MM HG: CPT | Mod: CPTII,,, | Performed by: OBSTETRICS & GYNECOLOGY

## 2024-10-17 NOTE — PROGRESS NOTES
Chief Complaint:  Postpartum Care    History of Present Illness:   Katerin Chen is a 24 y.o. female  s/p IUFD @23wks presents for her postpartum exam. Mood is good. Denies HI/SI. Able to care for self and children. Great family support. Does not desire birth control.      Gyn History:  Menstrual History  Cycle: No  Menarche Age: 10 years  No Cycle Reason: Other  Other Reason: Recent pregnancy    Menopause  Menopause Age: 1 years    Pap History  Last pap date: 07/10/24  Result: Normal  History of Abnormal Pap: No  HPV Vaccine Completed: Yes  HPV Vaccine Injection Type: 3 Injection Series    [place]  Sexually Active: Yes  Sexual Orientation: heterosexual  Postcoital Bleeding: No  Dyspareunia: No  STI History: Yes  STI Type: Trichomonas, Chlamydia  Contraception: No    Breast History  Last Breast Imaging Date: No  History of Breast Biopsy: No        Review of Systems:  General/Constitutional: Chills denies. Fatigue/weakness denies. Fever denies. Night sweats denies. Hot flashes denies  Gastrointestinal: Abdominal pain denies. Blood in stool denies. Constipation denies. Diarrhea denies. Heartburn denies. Nausea denies. Vomiting denies   Genitourinary: Incontinence denies. Blood in urine denies. Frequent urination denies. Urgency denies. Painful urination denies. Nocturia denies   Gynecologic: Irregular menses denies. Heavy bleeding  denies. Painful menses denies. Vaginal discharge denies. Vaginal odor denies. Vaginal itching/Irritation denies. Vaginal lesion denies.  Pelvic pain denies. Decreased libido denies. Vulvar lesion denies. Prolapse of genital organs denies. Painful intercourse denies. Postcoital bleeding denies   Psychiatric: Mood lability denies. Depressed mood denies. Suicidal thoughts denies. Anxiety denies. Overwhelmed denies. Appetite normal. Energy level normal     OB History    Para Term  AB Living   3 2 1 1 1 1   SAB IAB Ectopic Multiple Live Births   1 0 0 0 1      # 1 -  "Date: 2021, Sex: None, Weight: None, GA: 7w0d, Type: Spontaneous , Apgar1: None, Apgar5: None, Living: Fetal Demise, Birth Comments: None    # 2 - Date: 21, Sex: Female, Weight: 3.345 kg (7 lb 6 oz), GA: 38w4d, Type: Vaginal, Spontaneous, Apgar1: None, Apgar5: None, Living: Living, Birth Comments: None    # 3 - Date: 23, Sex: Female, Weight: 0.215 kg (7.6 oz), GA: 23w0d, Type: Vaginal, Spontaneous, Apgar1: 0, Apgar5: 0, Living: Fetal Demise, Birth Comments: None      Past Medical History:   Diagnosis Date    ADHD     Anxiety disorder, unspecified     Mental disorder        No current outpatient medications on file.      Physical Exam:  /72   Temp 97.2 °F (36.2 °C)   Ht 5' 3.5" (1.613 m)   Wt 81.5 kg (179 lb 9.6 oz)   BMI 31.32 kg/m²      Chaperone present.     Constitutional: General appearance: healthy, well-nourished and well-developed   Psychiatric: Orientation to time, place and person. Normal mood and affect and active, alert   Breast: Inspection/palpation: no tenderness, masses, skin changes or abnormal secretions   Abdomen: Auscultation/Inspection/Palpation: No tenderness or masses. Soft, nondistended   Female Genitalia:      Vulva: no masses, atrophy or lesions      Bladder/Urethra: no urethral discharge or mass, normal meatus, bladder non-distended.      Vagina: no tenderness, erythema, cystocele, rectocele, abnormal vaginal discharge, or vesicle(s) or ulcers                   Cervix: no discharge or cervical motion tenderness and grossly normal      Uterus: normal size and shape and midline, non-tender, and no uterine prolapse.      Adnexa/Parametria: no parametrial tenderness or mass, no adnexal tenderness or ovarian mass.       Assessment/Plan:  1. Postpartum exam  2. IUFD at 20 weeks or more of gestation  Patient doing well  Diet, exercise encouraged  Multivitamin    24  Kleihauer betke stain: negative  Toxo IgG/IgM: not drawn  Parvo: negative  CMV: negative  APS: " lupus anticoagulant (not detected), beta-2 glycoprotein (<9.4), antithrmbin 3 (79)  A1c: 4.7  TSH: 3.490  Placenta: benign  Prothrombin (F2) pathogenic variant: negative    Ofe to contact lab regarding pending chromosomal microarray    3. Encounter for contraceptive management  Discussed with patient contraceptive options including natural family planning, barrier, oral contraceptives, patch, NuvaRing, Depo-Provera, Nexplanon, IUDs, abstinence.       Safe sex education     Discussed with patient that birth control options discussed above do not protect against STDs.    Patient declines         This note was transcribed by Ofe Chvaarria MA. There may be transcription errors as a result, however minimal. I agree with transcription and every effort has been made to ensure accuracy of transcription, but any obvious errors or omissions should be clarified with the author of the document.

## 2024-10-22 ENCOUNTER — POSTPARTUM VISIT (OUTPATIENT)
Dept: OBSTETRICS AND GYNECOLOGY | Facility: CLINIC | Age: 24
End: 2024-10-22
Payer: MEDICAID

## 2024-10-22 VITALS
SYSTOLIC BLOOD PRESSURE: 120 MMHG | DIASTOLIC BLOOD PRESSURE: 72 MMHG | WEIGHT: 179.63 LBS | BODY MASS INDEX: 30.67 KG/M2 | HEIGHT: 64 IN | TEMPERATURE: 97 F

## 2024-10-22 DIAGNOSIS — O36.4XX0 IUFD AT 20 WEEKS OR MORE OF GESTATION: ICD-10-CM

## 2024-10-22 DIAGNOSIS — Z30.9 ENCOUNTER FOR CONTRACEPTIVE MANAGEMENT, UNSPECIFIED TYPE: ICD-10-CM

## 2025-01-29 ENCOUNTER — OFFICE VISIT (OUTPATIENT)
Dept: OBSTETRICS AND GYNECOLOGY | Facility: CLINIC | Age: 25
End: 2025-01-29
Payer: MEDICAID

## 2025-01-29 VITALS
DIASTOLIC BLOOD PRESSURE: 74 MMHG | HEIGHT: 64 IN | BODY MASS INDEX: 31.27 KG/M2 | WEIGHT: 183.19 LBS | TEMPERATURE: 97 F | SYSTOLIC BLOOD PRESSURE: 108 MMHG

## 2025-01-29 DIAGNOSIS — O36.4XX0 IUFD AT 20 WEEKS OR MORE OF GESTATION: ICD-10-CM

## 2025-01-29 DIAGNOSIS — N91.5 OLIGOMENORRHEA, UNSPECIFIED TYPE: Primary | ICD-10-CM

## 2025-01-29 LAB
B-HCG UR QL: POSITIVE
CTP QC/QA: YES

## 2025-01-29 PROCEDURE — 3078F DIAST BP <80 MM HG: CPT | Mod: CPTII,,, | Performed by: OBSTETRICS & GYNECOLOGY

## 2025-01-29 PROCEDURE — 81025 URINE PREGNANCY TEST: CPT | Mod: ,,, | Performed by: OBSTETRICS & GYNECOLOGY

## 2025-01-29 PROCEDURE — 3074F SYST BP LT 130 MM HG: CPT | Mod: CPTII,,, | Performed by: OBSTETRICS & GYNECOLOGY

## 2025-01-29 PROCEDURE — 1159F MED LIST DOCD IN RCRD: CPT | Mod: CPTII,,, | Performed by: OBSTETRICS & GYNECOLOGY

## 2025-01-29 PROCEDURE — 99213 OFFICE O/P EST LOW 20 MIN: CPT | Mod: TH,,, | Performed by: OBSTETRICS & GYNECOLOGY

## 2025-01-29 PROCEDURE — 87491 CHLMYD TRACH DNA AMP PROBE: CPT | Performed by: OBSTETRICS & GYNECOLOGY

## 2025-01-29 PROCEDURE — 87661 TRICHOMONAS VAGINALIS AMPLIF: CPT | Performed by: OBSTETRICS & GYNECOLOGY

## 2025-01-29 PROCEDURE — 3008F BODY MASS INDEX DOCD: CPT | Mod: CPTII,,, | Performed by: OBSTETRICS & GYNECOLOGY

## 2025-01-29 NOTE — PROGRESS NOTES
Chief Complaint:   Late menses      History of Present Illness:  Katerin Chen is a 24 y.o. year old  presents c/o missed cycle. LMP 24. Prior to this, having regular monthly cycles. Sexually active. Denies vaginal bleeding, discharge or pelvic pain.       Gyn History:  Menstrual History  Cycle: Yes  Menarche Age: 10 years  Flow Duration: 5  Flow: Normal  Intermenstrual Bleeding: No  Dysmenorrhea: Yes  Dysmenorrhea Severity : Mild    Menopause  Menopause Age: 1 years    Pap History  Last pap date: 07/10/24  Result: Normal  History of Abnormal Pap: No  HPV Vaccine Completed: Yes  HPV Vaccine Injection Type: 3 Injection Series    [place]  Sexually Active: Yes  Sexual Orientation: heterosexual  Postcoital Bleeding: No  Dyspareunia: No  STI History: Yes  STI Type: Trichomonas, Chlamydia  Contraception: No    Breast History  Last Breast Imaging Date: No  History of Breast Biopsy: No        Review of Systems:  General/Constitutional: Chills denies. Fatigue/weakness denies. Fever denies. Night sweats denies. Hot flashes denies.  Gastrointestinal: Abdominal pain denies. Blood in stool denies. Constipation denies. Diarrhea denies. Heartburn denies. Nausea denies. Vomiting denies.   Genitourinary: Incontinence denies. Blood in urine denies. Frequent urination denies. Urgency denies. Painful urination denies. Nocturia denies.  Gynecologic: Irregular menses denies. Heavy bleeding denies. Painful menses denies. Vaginal discharge denies. Vaginal odor denies. Vaginal itching/Irritation denies. Vaginal lesion denies.  Pelvic pain denies. Decreased libido denies. Vulvar lesion denies. Prolapse of genital organs denies. Painful intercourse denies. Postcoital bleeding denies.   Psychiatric: Mood lability denies. Depressed mood denies. Suicidal thoughts denies. Anxiety denies. Overwhelmed denies. Appetite normal. Energy level normal.    OB History    Para Term  AB Living   3 2 1 1 1 1   SAB IAB Ectopic  Multiple Live Births   1 0 0 0 1      # 1 - Date: 2021, Sex: None, Weight: None, GA: 7w0d, Type: Spontaneous , Apgar1: None, Apgar5: None, Living: Fetal Demise, Birth Comments: None    # 2 - Date: 21, Sex: Female, Weight: 3.345 kg (7 lb 6 oz), GA: 38w4d, Type: Vaginal, Spontaneous, Apgar1: None, Apgar5: None, Living: Living, Birth Comments: None    # 3 - Date: 23, Sex: Female, Weight: 0.215 kg (7.6 oz), GA: 23w0d, Type: Vaginal, Spontaneous, Apgar1: 0, Apgar5: 0, Living: Fetal Demise, Birth Comments: None      Past Medical History:   Diagnosis Date    ADHD     Anxiety disorder, unspecified     Mental disorder        Past Surgical History:   Procedure Laterality Date    TONSILLECTOMY Bilateral           Current Outpatient Medications:     prenatal vit no.124/iron/folic (PRENATAL VITAMIN ORAL), Take 1 tablet by mouth Daily., Disp: , Rfl:     Social History     Socioeconomic History    Marital status: Single   Tobacco Use    Smoking status: Former     Types: Vaping with nicotine     Start date:      Quit date: 2024     Years since quittin.7     Passive exposure: Never    Smokeless tobacco: Never   Substance and Sexual Activity    Alcohol use: Not Currently    Drug use: Never    Sexual activity: Yes     Partners: Male     Birth control/protection: None     Comment: STI: +TV, +CT     Social Drivers of Health     Financial Resource Strain: Low Risk  (2024)    Overall Financial Resource Strain (CARDIA)     Difficulty of Paying Living Expenses: Not very hard   Food Insecurity: No Food Insecurity (2024)    Hunger Vital Sign     Worried About Running Out of Food in the Last Year: Never true     Ran Out of Food in the Last Year: Never true   Transportation Needs: No Transportation Needs (2024)    TRANSPORTATION NEEDS     Transportation : No   Physical Activity: Inactive (2024)    Exercise Vital Sign     Days of Exercise per Week: 0 days     Minutes of Exercise per Session: 0  "min   Stress: Stress Concern Present (9/5/2024)    [employer] [place] [employer] - Occupational Stress Questionnaire     Feeling of Stress : To some extent   Housing Stability: Low Risk  (9/5/2024)    Housing Stability Vital Sign     Unable to Pay for Housing in the Last Year: No     Homeless in the Last Year: No         Physical Exam:  /74   Temp 96.8 °F (36 °C)   Ht 5' 3.5" (1.613 m)   Wt 83.1 kg (183 lb 3.2 oz)   LMP 12/17/2024   BMI 31.94 kg/m²     Chaperone present.       Constitutional: General appearance: healthy, well-nourished and well-developed  Psychiatric:  Orientation to time, place and person. Normal mood and affect and active, alert   Abdomen: Auscultation/Inspection/Palpation: No tenderness or masses. Soft, nondistended      Female Genitalia:      Vulva: no masses, atrophy or lesions      Bladder/Urethra: no urethral discharge or mass, normal meatus, bladder non-distended.      Vagina: no tenderness, erythema, cystocele, rectocele, abnormal vaginal discharge, or vesicle(s) or ulcers                   Cervix: no discharge or cervical motion tenderness and grossly normal      Uterus: normal size and shape and midline, non-tender, and no uterine prolapse.      Adnexa/Parametria: no parametrial tenderness or mass, no adnexal tenderness or ovarian mass.       Assessment/Plan:  1. Oligomenorrhea  UPT+   Educated   No HILTON   PNV, folic acid  Cultures: gc/cz/tv  Pain/fever/bleeding prec     Based on LMP, GA today 6w1d with GUERRERO of 9/23/2025  Follow up new ob visit in 2 weeks    2. IUFD at 20 weeks or more of gestation  New ob in 2 weeks         This note was transcribed by Ofe Chavarria MA. There may be transcription errors as a result, however minimal. I agree with transcription and every effort has been made to ensure accuracy of transcription, but any obvious errors or omissions should be clarified with the author of the document.        "

## 2025-01-30 LAB
C TRACH RRNA SPEC QL NAA+PROBE: NEGATIVE
N GONORRHOEA RRNA SPEC QL NAA+PROBE: NEGATIVE
SPECIMEN SOURCE: NORMAL
T VAGINALIS RRNA SPEC QL NAA+PROBE: NEGATIVE

## 2025-02-11 ENCOUNTER — INITIAL PRENATAL (OUTPATIENT)
Dept: OBSTETRICS AND GYNECOLOGY | Facility: CLINIC | Age: 25
End: 2025-02-11
Payer: MEDICAID

## 2025-02-11 VITALS
SYSTOLIC BLOOD PRESSURE: 114 MMHG | WEIGHT: 183.63 LBS | TEMPERATURE: 98 F | BODY MASS INDEX: 31.35 KG/M2 | DIASTOLIC BLOOD PRESSURE: 68 MMHG | HEIGHT: 64 IN

## 2025-02-11 DIAGNOSIS — O99.210 OBESITY IN PREGNANCY, ANTEPARTUM: ICD-10-CM

## 2025-02-11 DIAGNOSIS — Z3A.08 8 WEEKS GESTATION OF PREGNANCY: ICD-10-CM

## 2025-02-11 DIAGNOSIS — O36.4XX0 IUFD AT 20 WEEKS OR MORE OF GESTATION: Primary | ICD-10-CM

## 2025-02-11 DIAGNOSIS — Z34.91 FIRST TRIMESTER PREGNANCY: ICD-10-CM

## 2025-02-11 LAB
AMPHET UR QL SCN: NEGATIVE
BARBITURATE SCN PRESENT UR: NEGATIVE
BENZODIAZ UR QL SCN: NEGATIVE
BILIRUB UR QL STRIP: NEGATIVE
CANNABINOIDS UR QL SCN: POSITIVE
COCAINE UR QL SCN: NEGATIVE
GLUCOSE UR QL STRIP: NEGATIVE
KETONES UR QL STRIP: NEGATIVE
LEUKOCYTE ESTERASE UR QL STRIP: POSITIVE
METHADONE UR QL SCN: NEGATIVE
OPIATES UR QL SCN: NEGATIVE
PCP UR QL: NEGATIVE
PH UR: 6.5 [PH] (ref 5–8)
PH, POC UA: 7
POC BLOOD, URINE: NEGATIVE
POC NITRATES, URINE: NEGATIVE
PROT UR QL STRIP: NEGATIVE
SP GR UR STRIP: 1.03 (ref 1–1.03)
UROBILINOGEN UR STRIP-ACNC: 4 (ref 0.1–1.1)

## 2025-02-11 PROCEDURE — 87086 URINE CULTURE/COLONY COUNT: CPT | Performed by: OBSTETRICS & GYNECOLOGY

## 2025-02-11 PROCEDURE — 80307 DRUG TEST PRSMV CHEM ANLYZR: CPT | Performed by: OBSTETRICS & GYNECOLOGY

## 2025-02-11 NOTE — PROGRESS NOTES
Chief Complaint:  Initial Prenatal Visit    History of Present Illness:  Katerin Chen is a 24 y.o. year old  presents for her new ob at 8w0d by LMP 24. She is doing well.         Gyn History:  Menstrual History  Cycle: No  Menarche Age: 10 years  No Cycle Reason: Medical  Medical Reason: pregnant    Menopause  Menopause Age: 1 years    Pap History  Last pap date: 07/10/24  Result: Normal  History of Abnormal Pap: No  HPV Vaccine Completed: Yes  HPV Vaccine Injection Type: 3 Injection Series    [place]  Sexually Active: Yes  Sexual Orientation: heterosexual  Postcoital Bleeding: No  Dyspareunia: No  STI History: Yes  STI Type: Trichomonas, Chlamydia  Contraception: No    Breast History  Last Breast Imaging Date: No  History of Breast Biopsy: No        Review of Systems:  General/Constitutional: Chills denies. Fatigue/weakness denies. Fever denies. Night sweats denies. Hot flashes denies.   Respiratory: Cough denies. Hemoptysis denies. SOB denies. Sputum production denies. Wheezing denies.   Cardiovascular: Chest pain denies. Dizziness denies. Palpitations denies. Swelling in hands/feet denies.    Gastrointestinal: Abdominal pain denies. Blood in stool denies. Constipation denies. Diarrhea denies. Heartburn denies. Nausea denies. Vomiting denies.   Genitourinary: Incontinence denies. Blood in urine denies. Frequent urination denies. Painful urination denies.  Urinary urgency denies.  Nocturia denies.   Gynecologic: Irregular menses denies. Heavy bleeding denies. Painful menses denies. Vaginal discharge denies. Vaginal odor denies. Vaginal itching denies. Vaginal lesion denies.  Pelvic pain denies. Decreased libido denies. Vulvar lesion denies. Prolapse of genital organs denies. Painful intercourse denies. Postcoital bleeding denies.   Psychiatric: Depression denies. Anxiety denies.     OB History    Para Term  AB Living   4 2 1 1 1 1   SAB IAB Ectopic Multiple Live Births   1 0 0 0 1       # 1 - Date: 2021, Sex: None, Weight: None, GA: 7w0d, Type: Spontaneous , Apgar1: None, Apgar5: None, Living: Fetal Demise, Birth Comments: None    # 2 - Date: 22, Sex: Female, Weight: 3.345 kg (7 lb 6 oz), GA: 38w4d, Type: Vaginal, Spontaneous, Apgar1: None, Apgar5: None, Living: Living, Birth Comments: None    # 3 - Date: 24, Sex: Female, Weight: 0.215 kg (7.6 oz), GA: 23w0d, Type: Vaginal, Spontaneous, Apgar1: 0, Apgar5: 0, Living: Fetal Demise, Birth Comments: None    # 4 - Date: None, Sex: None, Weight: None, GA: None, Type: None, Apgar1: None, Apgar5: None, Living: None, Birth Comments: None        Past Medical History:   Diagnosis Date    ADHD     Anxiety disorder, unspecified     Mental disorder        Current Outpatient Medications:     prenatal vit no.124/iron/folic (PRENATAL VITAMIN ORAL), Take 1 tablet by mouth Daily., Disp: , Rfl:     Review of patient's allergies indicates:  No Known Allergies    Past Surgical History:   Procedure Laterality Date    TONSILLECTOMY Bilateral      Family History   Problem Relation Name Age of Onset    Lung cancer Maternal Grandfather      Diabetes Father      Heart disease Father      Breast cancer Neg Hx      Colon cancer Neg Hx      Ovarian cancer Neg Hx      Uterine cancer Neg Hx      Cervical cancer Neg Hx       Social History     Socioeconomic History    Marital status: Single   Tobacco Use    Smoking status: Former     Types: Vaping with nicotine     Start date:      Quit date: 2024     Years since quittin.7     Passive exposure: Never    Smokeless tobacco: Never   Substance and Sexual Activity    Alcohol use: Not Currently    Drug use: Never    Sexual activity: Yes     Partners: Male     Birth control/protection: None     Comment: STI: +TV, +CT     Social Drivers of Health     Financial Resource Strain: Low Risk  (2024)    Overall Financial Resource Strain (CARDIA)     Difficulty of Paying Living Expenses: Not very hard  "  Food Insecurity: No Food Insecurity (9/5/2024)    Hunger Vital Sign     Worried About Running Out of Food in the Last Year: Never true     Ran Out of Food in the Last Year: Never true   Transportation Needs: No Transportation Needs (9/5/2024)    TRANSPORTATION NEEDS     Transportation : No   Physical Activity: Inactive (9/5/2024)    Exercise Vital Sign     Days of Exercise per Week: 0 days     Minutes of Exercise per Session: 0 min   Stress: Stress Concern Present (9/5/2024)    [employer] [place] [employer] - Occupational Stress Questionnaire     Feeling of Stress : To some extent   Housing Stability: Low Risk  (9/5/2024)    Housing Stability Vital Sign     Unable to Pay for Housing in the Last Year: No     Homeless in the Last Year: No       Physical Exam:  /68 (BP Location: Right arm, Patient Position: Sitting)   Temp 97.5 °F (36.4 °C)   Ht 5' 3.5" (1.613 m)   Wt 83.3 kg (183 lb 9.6 oz)   LMP 12/17/2024   BMI 32.01 kg/m²     Chaperone: present.       Constitutional: General appearance: healthy, well-nourished and well-developed   Psychiatric:  Orientation to time, place and person. Normal mood and affect and active, alert   Abdomen: Auscultation/Inspection/Palpation: No tenderness or masses. Soft, nondistended         Assessment/Plan:  1. H/o IUFD at 20 weeks or more of gestation  Reviewed with pt previous los and abnl anatomy scan at that time  NIPS, Level II with MFM     MFM referral - Dr. Daniels       9/4/24    Kleihrosalba betke stain: negative  Toxo IgG/IgM: not drawn  Parvo: negative  CMV: negative  APS: lupus anticoagulant (not detected), beta-2 glycoprotein (<9.4), antithrmbin 3 (79)  DRVVT: no evidence of lupus anticoagulant  A1c: 4.7  TSH: 3.490  Placenta: benign  Prothrombin (F2) pathogenic variant: negative     2024 IUFD ANATOMY SCAN:   Fetal measurements: Biparietal diameter is 4.2 cm/18 weeks 5 days/9th percentile, head circumference is 15.9 cm/18 weeks 5 days/4th percentile, " abdominal circumference is 13.3 cm/18 weeks 5 days/11th percentile, femur length is 3.0 cm/19 weeks 3 days/21st percentile and the estimated fetal weight is 269 g/19 weeks 0 days/8th percentile (based on estimated gestational age).     Impression:     1. There is a single intrauterine pregnancy currently in a transverse presentation with a mean sonographic gestational age of 18 weeks 6 days.  See above comments regarding fetal measurements in growth percentiles.      2. Obesity in pregnancy, antepartum  Obesity, especially class III obesity (BMI >= 40) is associated with numerous adverse pregnancy outcomes. These include but are not limited to miscarriage, poor ultrasound visualization, increase in congenital malformations (especially NTD's ),  birth, ascending infections, gestational diabetes, hypertensive diseases of pregnancy, macrosomia, shoulder dystocia, cerebral palsy and surgical complications such as wound infections, dehiscence and thrombosis. Macrosomia and the incidence of intrapartum complications related to macrosomia, such as shoulder dystocia, malpresentation, hemorrhage, and fourth degree laceration are also increased in obese gravidas. Obese women also have a high rate of  delivery and are more likely to have surgical, anesthetic, or postpartum complications.  birth in obese gravidas is primarily associated with obesity-related medical and  complications, rather than an intrinsic predisposition to spontaneous  labor.     Recommendations:  Recommended gestational weight gain of 11-20 pounds. Consider dietary counseling.  Initiate low dose aspirin 81 mg daily for preeclampsia risk reduction at 12-16 weeks  Patients should receive adequate counseling regarding risk factors and possible difficulties with accurate labor monitoring,  delivery operative complications, and anesthesia.   With the increased risk of gestational diabetes, early 1st trimester  screening is often advocated, however, there are no data demonstrating the effectiveness of this early screening in improving pregnancy outcomes.   Fetal growth assessment via fundal height monitoring is difficult in the setting of obesity. When the fundal height cannot be reliably followed or BMI >= 40, ultrasound evaluation of fetal growth is recommended at 32 weeks or sooner if other co-morbidities or indications exist.   Patients with a BMI of 40 or greater are at the highest risk for adverse pregnancy outcome, including stillbirth. Therefore, weekly  surveillance is recommended beginning at 34 weeks until delivery; other co-morbidities may require more frequent testing.  Consider Lovenox 40 mg BID for VTE prophylaxis while admitted to the hospital (antepartum or postpartum) if BMI >= 40.      Begin 81 ASA at 12wks    3. 8 weeks gestation of pregnancy  We discussed diet/supplements and modifiable risk factors.     Patients advised to continue moderate physical activity.       Patients will report unusual headaches, visual disturbances, pelvic pain or cramping, vaginal bleeding, rupture of membranes, extreme swelling in hands or face, diminished urine volume, any prolonged illness or infection, or persistent symptoms of labor.       +FHT   GA: 8w0d by LMP  GUERRERO: 2025 by LMP  PNL, PNV, folic acid  Desires [place]  RTC 4 weeks        Educated on compliance of future appts  Future Appointments   Date Time Provider Department Center   7/15/2025  1:00 PM Marah Joiner MD [facility]       This note was transcribed by Ofe Chavarria MA. There may be transcription errors as a result, however minimal. I agree with transcription and every effort has been made to ensure accuracy of transcription, but any obvious errors or omissions should be clarified with the author of the document.

## 2025-02-14 LAB — BACTERIA UR CULT: NORMAL

## 2025-02-24 ENCOUNTER — LAB VISIT (OUTPATIENT)
Dept: LAB | Facility: HOSPITAL | Age: 25
End: 2025-02-24
Attending: OBSTETRICS & GYNECOLOGY
Payer: MEDICAID

## 2025-02-24 DIAGNOSIS — Z3A.08 8 WEEKS GESTATION OF PREGNANCY: ICD-10-CM

## 2025-02-24 LAB
ERYTHROCYTE [DISTWIDTH] IN BLOOD BY AUTOMATED COUNT: 11.7 % (ref 11–14.5)
GROUP & RH: NORMAL
HBV SURFACE AG SERPL QL IA: NEGATIVE
HBV SURFACE AG SERPL QL IA: NORMAL
HCT VFR BLD AUTO: 38.9 % (ref 36–48)
HGB BLD-MCNC: 13.7 G/DL (ref 11.8–16)
INDIRECT COOMBS: NORMAL
MCH RBC QN AUTO: 32.3 PG (ref 27–34)
MCHC RBC AUTO-ENTMCNC: 35.2 G/DL (ref 31–37)
MCV RBC AUTO: 91.7 FL (ref 79–99)
NRBC BLD AUTO-RTO: 0 %
PLATELET # BLD AUTO: 266 X10(3)/MCL (ref 140–371)
PMV BLD AUTO: 10.4 FL (ref 9.4–12.4)
RBC # BLD AUTO: 4.24 X10(6)/MCL (ref 4–5.1)
RUBELLA AB, IGG (OLG): 122 IU/ML
SPECIMEN OUTDATE: NORMAL
WBC # BLD AUTO: 7.96 X10(3)/MCL (ref 4–11.5)

## 2025-02-24 PROCEDURE — 86762 RUBELLA ANTIBODY: CPT

## 2025-02-24 PROCEDURE — 87389 HIV-1 AG W/HIV-1&-2 AB AG IA: CPT

## 2025-02-24 PROCEDURE — 87340 HEPATITIS B SURFACE AG IA: CPT

## 2025-02-24 PROCEDURE — 86900 BLOOD TYPING SEROLOGIC ABO: CPT | Performed by: OBSTETRICS & GYNECOLOGY

## 2025-02-24 PROCEDURE — 86780 TREPONEMA PALLIDUM: CPT

## 2025-02-24 PROCEDURE — 85027 COMPLETE CBC AUTOMATED: CPT

## 2025-02-24 PROCEDURE — 86787 VARICELLA-ZOSTER ANTIBODY: CPT

## 2025-02-24 PROCEDURE — 36415 COLL VENOUS BLD VENIPUNCTURE: CPT

## 2025-02-24 PROCEDURE — 86803 HEPATITIS C AB TEST: CPT

## 2025-02-24 PROCEDURE — 83021 HEMOGLOBIN CHROMOTOGRAPHY: CPT

## 2025-02-25 LAB
HCV AB SERPL QL IA: NONREACTIVE
HIV 1+2 AB+HIV1 P24 AG SERPL QL IA: NONREACTIVE
T PALLIDUM AB SER QL: NONREACTIVE

## 2025-02-26 LAB
HGB A MFR BLD ELPH: 97.6 % (ref 95.8–98)
HGB A2 MFR BLD ELPH: 2.4 % (ref 2–3.3)
HGB F MFR BLD ELPH: 0 % (ref 0–0.9)
HGB FRACT BLD ELPH-IMP: NORMAL
M HPLC HB VARIANT, B: NORMAL
VZV IGG SER IA-ACNC: 2.4
VZV IGG SER QL IA: POSITIVE

## 2025-02-27 NOTE — PROGRESS NOTES
"Chief Complaint:  Routine Prenatal Visit    History of Present Illness:  Katerin Chen is a 24 y.o. year old  at 12w0d presents for her ob exam. She is doing well. No complaints today.         Review of Systems:  General/Constitutional: Fever denies. Headache denies.     Skin: Rash denies.   Respiratory: Cough denies. SOB denies. Wheezing denies .   Cardiovascular: Chest pain denies. Dizziness denies. Palpitations denies. Swelling in hands/feet denies.    Gastrointestinal: Abdominal pain denies. Constipation denies. Diarrhea denies. Heartburn denies. Nausea denies. Vomiting denies.  Genitourinary: Painful urination denies.   Gynecologic: Vaginal bleeding denies. Vaginal discharge Normal. Leaking amniotic fluid denies.  Contractions denies.  Psychiatric: Depressed mood denies. Suicidal thoughts denies.     Current Medications[1]    Physical Exam:  /70 (BP Location: Right arm, Patient Position: Sitting)   Temp 97.7 °F (36.5 °C)   Ht 5' 3.5" (1.613 m)   Wt 83.9 kg (185 lb)   LMP 2024   BMI 32.26 kg/m²       Constitutional: General appearance: healthy, well-nourished and well-developed   Psychiatric:  Orientation to time, place and person. Normal mood and affect and active, alert   Abdomen: Auscultation/Inspection/Palpation: Gravid. No tenderness or masses. Soft, nondistended   Extremities: no CCE    FHT: 150      Assessment/Plan  1. IUFD at 23 weeks  Reviewed with pt previous los and abnl anatomy scan at that time  NIPS, Level II with [facility] referral - Dr. Daniels - 25  Pt strongly desires to do US in [facility] due to transportation  Educated on lmiations of anatomy scan vs Level II states understanding. Desires Anatomy scan  @ OA and declines MFM consult    Ofe notified [facility]  Anatomy scan ordered for OA       24   Estrella betke stain: negative  Toxo IgG/IgM: not drawn  Parvo: negative  CMV: negative  APS: lupus anticoagulant (not detected), beta-2 glycoprotein " (<9.4), antithrmbin 3 (79)  DRVVT: no evidence of lupus anticoagulant  A1c: 4.7  TSH: 3.490  Placenta: benign  Prothrombin (F2) pathogenic variant: negative      IUFD ANATOMY SCAN:   Fetal measurements: Biparietal diameter is 4.2 cm/18 weeks 5 days/9th percentile, head circumference is 15.9 cm/18 weeks 5 days/4th percentile, abdominal circumference is 13.3 cm/18 weeks 5 days/11th percentile, femur length is 3.0 cm/19 weeks 3 days/21st percentile and the estimated fetal weight is 269 g/19 weeks 0 days/8th percentile (based on estimated gestational age).     Impression:     1. There is a single intrauterine pregnancy currently in a transverse presentation with a mean sonographic gestational age of 18 weeks 6 days.  See above comments regarding fetal measurements in growth percentiles.        2. Obesity in pregnancy, antepartum  Obesity, especially class III obesity (BMI >= 40) is associated with numerous adverse pregnancy outcomes. These include but are not limited to miscarriage, poor ultrasound visualization, increase in congenital malformations (especially NTD's ),  birth, ascending infections, gestational diabetes, hypertensive diseases of pregnancy, macrosomia, shoulder dystocia, cerebral palsy and surgical complications such as wound infections, dehiscence and thrombosis. Macrosomia and the incidence of intrapartum complications related to macrosomia, such as shoulder dystocia, malpresentation, hemorrhage, and fourth degree laceration are also increased in obese gravidas. Obese women also have a high rate of  delivery and are more likely to have surgical, anesthetic, or postpartum complications.  birth in obese gravidas is primarily associated with obesity-related medical and  complications, rather than an intrinsic predisposition to spontaneous  labor.      Recommendations:  Recommended gestational weight gain of 11-20 pounds. Consider dietary counseling.  Initiate low  dose aspirin 81 mg daily for preeclampsia risk reduction at 12-16 weeks  Patients should receive adequate counseling regarding risk factors and possible difficulties with accurate labor monitoring,  delivery operative complications, and anesthesia.   With the increased risk of gestational diabetes, early 1st trimester screening is often advocated, however, there are no data demonstrating the effectiveness of this early screening in improving pregnancy outcomes.   Fetal growth assessment via fundal height monitoring is difficult in the setting of obesity. When the fundal height cannot be reliably followed or BMI >= 40, ultrasound evaluation of fetal growth is recommended at 32 weeks or sooner if other co-morbidities or indications exist.   Patients with a BMI of 40 or greater are at the highest risk for adverse pregnancy outcome, including stillbirth. Therefore, weekly  surveillance is recommended beginning at 34 weeks until delivery; other co-morbidities may require more frequent testing.  Consider Lovenox 40 mg BID for VTE prophylaxis while admitted to the hospital (antepartum or postpartum) if BMI >= 40.      Begin 81 ASA     3. Marijuana use in pregnancy  Advised against in pregnancy    4. 12 weeks gestation of pregnancy  Instructed on weight gain, healthy exercise, vitamins, avoidance of drugs tobacco and alcohol. Pain, fever, bleeding precautions.        Discussed with patient travel precautions.  RTC 4 weeks       Educated on compliance of future appts  Future Appointments   Date Time Provider Department Center   2025  1:10 PM Marah Joiner MD [facility]   7/15/2025  1:00 [facility]       This note was transcribed by Ofe Chavarria MA. There may be transcription errors as a result, however minimal. I agree with transcription and every effort has been made to ensure accuracy of transcription, but any obvious errors or omissions should be  clarified with the author of the document.               [1]   Current Outpatient Medications:     prenatal vit no.124/iron/folic (PRENATAL VITAMIN ORAL), Take 1 tablet by mouth Daily., Disp: , Rfl:

## 2025-03-11 ENCOUNTER — ROUTINE PRENATAL (OUTPATIENT)
Dept: OBSTETRICS AND GYNECOLOGY | Facility: CLINIC | Age: 25
End: 2025-03-11
Payer: MEDICAID

## 2025-03-11 VITALS
WEIGHT: 185 LBS | HEIGHT: 64 IN | SYSTOLIC BLOOD PRESSURE: 116 MMHG | DIASTOLIC BLOOD PRESSURE: 70 MMHG | TEMPERATURE: 98 F | BODY MASS INDEX: 31.58 KG/M2

## 2025-03-11 DIAGNOSIS — Z3A.12 12 WEEKS GESTATION OF PREGNANCY: ICD-10-CM

## 2025-03-11 DIAGNOSIS — O99.210 OBESITY IN PREGNANCY, ANTEPARTUM: ICD-10-CM

## 2025-03-11 DIAGNOSIS — O36.4XX0 IUFD AT 20 WEEKS OR MORE OF GESTATION: Primary | ICD-10-CM

## 2025-03-11 DIAGNOSIS — F12.90 MARIJUANA USE DURING PREGNANCY: ICD-10-CM

## 2025-03-11 DIAGNOSIS — O99.320 MARIJUANA USE DURING PREGNANCY: ICD-10-CM

## 2025-03-11 PROCEDURE — 99214 OFFICE O/P EST MOD 30 MIN: CPT | Mod: TH,,, | Performed by: OBSTETRICS & GYNECOLOGY

## 2025-04-03 NOTE — PROGRESS NOTES
"Chief Complaint:  Routine Prenatal Visit    History of Present Illness:  Katerin Chen is a 24 y.o. year old  at 16w0d presents for her ob exam. She is doing well. No complaints today.         Review of Systems:  General/Constitutional: Fever denies. Headache denies.     Skin: Rash denies.   Respiratory: Cough denies. SOB denies. Wheezing denies .   Cardiovascular: Chest pain denies. Dizziness denies. Palpitations denies. Swelling in hands/feet denies.    Gastrointestinal: Abdominal pain denies. Constipation denies. Diarrhea denies. Heartburn denies. Nausea denies. Vomiting denies.  Genitourinary: Painful urination denies.   Gynecologic: Vaginal bleeding denies. Vaginal discharge Normal. Leaking amniotic fluid denies.  Contractions denies.  Psychiatric: Depressed mood denies. Suicidal thoughts denies.     Current Medications[1]    Physical Exam:  /74 (BP Location: Right arm, Patient Position: Sitting)   Temp 97.3 °F (36.3 °C)   Ht 5' 3.5" (1.613 m)   Wt 84.2 kg (185 lb 9.6 oz)   LMP 2024   BMI 32.36 kg/m²       Constitutional: General appearance: healthy, well-nourished and well-developed   Psychiatric:  Orientation to time, place and person. Normal mood and affect and active, alert   Abdomen: Auscultation/Inspection/Palpation: Gravid. No tenderness or masses. Soft, nondistended   Extremities: no CCE    FHT: 150      Assessment/Plan  1. IUFD at 20 weeks or more of gestation  Reviewed with pt previous los and abnl anatomy scan at that time     [facility] referral, Pt strongly desires to do US in [facility] due to transportation  Educated on lmiations of anatomy scan vs Level II states understanding. Desires Anatomy scan @ [facility] and declines [facility] consult       24   sEtrella allison stain: negative  Toxo IgG/IgM: not drawn  Parvo: negative  CMV: negative  APS: lupus anticoagulant (not detected), beta-2 glycoprotein (<9.4), antithrmbin 3 (79)  DRVVT: no evidence of lupus anticoagulant  A1c: " 4.7  TSH: 3.490  Placenta: benign  Prothrombin (F2) pathogenic variant: negative      IUFD ANATOMY SCAN:  Fetal measurements: Biparietal diameter is 4.2 cm/18 weeks 5 days/9th percentile, head circumference is 15.9 cm/18 weeks 5 days/4th percentile, abdominal circumference is 13.3 cm/18 weeks 5 days/11th percentile, femur length is 3.0 cm/19 weeks 3 days/21st percentile and the estimated fetal weight is 269 g/19 weeks 0 days/8th percentile (based on estimated gestational age).     Impression:   1. There is a single intrauterine pregnancy currently in a transverse presentation with a mean sonographic gestational age of 18 weeks 6 days.  See above comments regarding fetal measurements in growth percentiles.     2. Obesity in pregnancy, antepartum  Patient educated on risks and complications of obesity and pregnancy including but not limited to first trimester miscarriage recurrent miscarriages, congenital anomalies, HTN disorders, gestational diabetes, macrosomia, PTL&D, higher risk of fetal demise in-utero and higher risk of CS (and wound complication including infection breakdown) with obesity.  The [place] preventative task force placed the following recommendations in 2016 which ACOG supports regarding utilization of low dose 81mg ASA starting at 14 weeks and continuing through pregnancy in high risk pregnant women. The utilization of this has been shown to reduce the risk for preeclampsia by 24% in clinical trials and reduce the risk of  birth by 14% and IUGR about 20%.     Cont. 81mg ASA    3. Marijuana use during pregnancy  Advised against in pregnancy     4. 16 weeks gestation of pregnancy  Instructed on weight gain, healthy exercise, vitamins, avoidance of drugs tobacco and alcohol. Pain, fever, bleeding precautions.        Discussed with patient travel precautions.  MSAFP  RTC 4 weeks       Educated on compliance of future appts  Future Appointments   Date Time Provider Department Center   7/15/2025   1:00 PM Marah Joiner MD [facility]       This note was transcribed by Ofe Chavarria MA. There may be transcription errors as a result, however minimal. I agree with transcription and every effort has been made to ensure accuracy of transcription, but any obvious errors or omissions should be clarified with the author of the document.               [1]   Current Outpatient Medications:     prenatal vit no.124/iron/folic (PRENATAL VITAMIN ORAL), Take 1 tablet by mouth Daily., Disp: , Rfl:

## 2025-04-08 ENCOUNTER — ROUTINE PRENATAL (OUTPATIENT)
Dept: OBSTETRICS AND GYNECOLOGY | Facility: CLINIC | Age: 25
End: 2025-04-08
Payer: MEDICAID

## 2025-04-08 ENCOUNTER — PATIENT MESSAGE (OUTPATIENT)
Dept: OTHER | Facility: OTHER | Age: 25
End: 2025-04-08
Payer: MEDICAID

## 2025-04-08 VITALS
HEIGHT: 64 IN | SYSTOLIC BLOOD PRESSURE: 116 MMHG | DIASTOLIC BLOOD PRESSURE: 74 MMHG | WEIGHT: 185.63 LBS | BODY MASS INDEX: 31.69 KG/M2 | TEMPERATURE: 97 F

## 2025-04-08 DIAGNOSIS — O36.4XX0 IUFD AT 20 WEEKS OR MORE OF GESTATION: Primary | ICD-10-CM

## 2025-04-08 DIAGNOSIS — O99.320 MARIJUANA USE DURING PREGNANCY: ICD-10-CM

## 2025-04-08 DIAGNOSIS — F12.90 MARIJUANA USE DURING PREGNANCY: ICD-10-CM

## 2025-04-08 DIAGNOSIS — O99.210 OBESITY IN PREGNANCY, ANTEPARTUM: ICD-10-CM

## 2025-04-08 DIAGNOSIS — Z3A.16 16 WEEKS GESTATION OF PREGNANCY: ICD-10-CM

## 2025-04-15 ENCOUNTER — PATIENT MESSAGE (OUTPATIENT)
Dept: OTHER | Facility: OTHER | Age: 25
End: 2025-04-15
Payer: MEDICAID

## 2025-04-24 NOTE — PROGRESS NOTES
Chief Complaint:  Routine Prenatal Visit    History of Present Illness:  Katerin Chen is a 25 y.o. year old  at 19w0d presents for her ob exam. She is doing well. No complaints today. Pt presented to  over the weekend with back pain, no resolved.        Review of Systems:  General/Constitutional: Fever denies. Headache denies.     Skin: Rash denies.   Respiratory: Cough denies. SOB denies. Wheezing denies .   Cardiovascular: Chest pain denies. Dizziness denies. Palpitations denies. Swelling in hands/feet denies.    Gastrointestinal: Abdominal pain denies. Constipation denies. Diarrhea denies. Heartburn denies. Nausea denies. Vomiting denies.  Genitourinary: Painful urination denies.   Gynecologic: Vaginal bleeding denies. Vaginal discharge Normal. Leaking amniotic fluid denies.  Contractions denies.  Psychiatric: Depressed mood denies. Suicidal thoughts denies.     Current Medications[1]    Physical Exam:  /70   Wt 85.5 kg (188 lb 6.4 oz)   LMP 2024   BMI 32.85 kg/m²       Constitutional: General appearance: healthy, well-nourished and well-developed   Psychiatric:  Orientation to time, place and person. Normal mood and affect and active, alert   Abdomen: Auscultation/Inspection/Palpation: Gravid. No tenderness or masses. Soft, nondistended   Extremities: no CCE    FHT: 140      Assessment/Plan  1. IUFD at 20 weeks or more of gestation  [facility]     [facility] referral, Pt strongly desires to do US in [facility] due to transportation  Educated on lmiations of anatomy scan vs Level II states understanding. Desires Anatomy scan @ [facility] and declines [facility] consult    Anatomy scan 25   Estrella allison stain: negative  Toxo IgG/IgM: not drawn  Parvo: negative  CMV: negative  APS: lupus anticoagulant (not detected), beta-2 glycoprotein (<9.4), antithrmbin 3 (79)  DRVVT: no evidence of lupus anticoagulant  A1c: 4.7  TSH:  3.490  Placenta: benign  Prothrombin (F2) pathogenic variant: negative      IUFD ANATOMY SCAN:  Fetal measurements: Biparietal diameter is 4.2 cm/18 weeks 5 days/9th percentile, head circumference is 15.9 cm/18 weeks 5 days/4th percentile, abdominal circumference is 13.3 cm/18 weeks 5 days/11th percentile, femur length is 3.0 cm/19 weeks 3 days/21st percentile and the estimated fetal weight is 269 g/19 weeks 0 days/8th percentile (based on estimated gestational age).     Impression:   1. There is a single intrauterine pregnancy currently in a transverse presentation with a mean sonographic gestational age of 18 weeks 6 days.  See above comments regarding fetal measurements in growth percentiles.       2. Obesity in pregnancy, antepartum  Patient educated on risks and complications of obesity and pregnancy including but not limited to first trimester miscarriage recurrent miscarriages, congenital anomalies, HTN disorders, gestational diabetes, macrosomia, PTL&D, higher risk of fetal demise in-utero and higher risk of CS (and wound complication including infection breakdown) with obesity.  The [place] preventative task force placed the following recommendations in 2016 which ACOG supports regarding utilization of low dose 81mg ASA starting at 14 weeks and continuing through pregnancy in high risk pregnant women. The utilization of this has been shown to reduce the risk for preeclampsia by 24% in clinical trials and reduce the risk of  birth by 14% and IUGR about 20%.      Cont. 81mg ASA     3. 19 weeks gestation of pregnancy  Instructed on weight gain, healthy exercise, vitamins, avoidance of drugs tobacco and alcohol. Pain, fever, bleeding precautions.        Discussed with patient travel precautions.  Educated on MSAFP  Advised to keep anatomy US appt  RTC 4 weeks       Educated on compliance of future appts  Future Appointments   Date Time Provider Department Center   2025  1:30 PM [facility] US2 [facility] ULTRA  [place]   5/27/2025  9:20 AM Marah Joiner MD [facility]   7/15/2025  1:00 PM Marah Joiner MD [facility]       This note was transcribed by Ofe Chavarria MA. There may be transcription errors as a result, however minimal. I agree with transcription and every effort has been made to ensure accuracy of transcription, but any obvious errors or omissions should be clarified with the author of the document.               [1]   Current Outpatient Medications:     aspirin 81 MG Chew, Take 81 mg by mouth once daily., Disp: , Rfl:     prenatal vit no.124/iron/folic (PRENATAL VITAMIN ORAL), Take 1 tablet by mouth Daily., Disp: , Rfl:

## 2025-04-27 ENCOUNTER — HOSPITAL ENCOUNTER (OUTPATIENT)
Facility: HOSPITAL | Age: 25
Discharge: HOME | End: 2025-04-27
Attending: OBSTETRICS & GYNECOLOGY | Admitting: OBSTETRICS & GYNECOLOGY
Payer: MEDICAID

## 2025-04-27 VITALS
DIASTOLIC BLOOD PRESSURE: 68 MMHG | RESPIRATION RATE: 18 BRPM | SYSTOLIC BLOOD PRESSURE: 117 MMHG | OXYGEN SATURATION: 97 % | HEART RATE: 94 BPM | TEMPERATURE: 99 F

## 2025-04-27 DIAGNOSIS — Z36.89 ENCOUNTER FOR TRIAGE IN PREGNANT PATIENT: ICD-10-CM

## 2025-04-27 LAB
BACTERIA #/AREA URNS AUTO: ABNORMAL /HPF
BACTERIA #/AREA URNS AUTO: ABNORMAL /HPF
BILIRUB UR QL STRIP.AUTO: NEGATIVE
BILIRUB UR QL STRIP.AUTO: NEGATIVE
CLARITY UR: ABNORMAL
CLARITY UR: ABNORMAL
COLOR UR AUTO: YELLOW
COLOR UR AUTO: YELLOW
GLUCOSE UR QL STRIP: NEGATIVE
GLUCOSE UR QL STRIP: NEGATIVE
HGB UR QL STRIP: ABNORMAL
HGB UR QL STRIP: ABNORMAL
KETONES UR QL STRIP: ABNORMAL
KETONES UR QL STRIP: NEGATIVE
LEUKOCYTE ESTERASE UR QL STRIP: ABNORMAL
LEUKOCYTE ESTERASE UR QL STRIP: NEGATIVE
MUCOUS THREADS URNS QL MICRO: ABNORMAL /LPF
MUCOUS THREADS URNS QL MICRO: ABNORMAL /LPF
NITRITE UR QL STRIP: POSITIVE
NITRITE UR QL STRIP: POSITIVE
PH UR STRIP: 6 [PH]
PH UR STRIP: 6 [PH]
PROT UR QL STRIP: ABNORMAL
PROT UR QL STRIP: NEGATIVE
RBC #/AREA URNS AUTO: ABNORMAL /HPF
RBC #/AREA URNS AUTO: ABNORMAL /HPF
SP GR UR STRIP.AUTO: 1.02 (ref 1–1.03)
SP GR UR STRIP.AUTO: >=1.03 (ref 1–1.03)
SQUAMOUS #/AREA URNS AUTO: ABNORMAL /HPF
SQUAMOUS #/AREA URNS AUTO: ABNORMAL /HPF
UROBILINOGEN UR STRIP-ACNC: 0.2
UROBILINOGEN UR STRIP-ACNC: 1
WBC #/AREA URNS AUTO: ABNORMAL /HPF
WBC #/AREA URNS AUTO: ABNORMAL /HPF

## 2025-04-27 PROCEDURE — 87086 URINE CULTURE/COLONY COUNT: CPT | Performed by: OBSTETRICS & GYNECOLOGY

## 2025-04-27 PROCEDURE — 25000003 PHARM REV CODE 250: Performed by: OBSTETRICS & GYNECOLOGY

## 2025-04-27 PROCEDURE — 81015 MICROSCOPIC EXAM OF URINE: CPT | Performed by: OBSTETRICS & GYNECOLOGY

## 2025-04-27 PROCEDURE — 81003 URINALYSIS AUTO W/O SCOPE: CPT | Performed by: OBSTETRICS & GYNECOLOGY

## 2025-04-27 PROCEDURE — 99211 OFF/OP EST MAY X REQ PHY/QHP: CPT

## 2025-04-27 RX ORDER — SODIUM CHLORIDE, SODIUM LACTATE, POTASSIUM CHLORIDE, CALCIUM CHLORIDE 600; 310; 30; 20 MG/100ML; MG/100ML; MG/100ML; MG/100ML
INJECTION, SOLUTION INTRAVENOUS CONTINUOUS
Status: DISCONTINUED | OUTPATIENT
Start: 2025-04-27 | End: 2025-04-28 | Stop reason: HOSPADM

## 2025-04-27 RX ORDER — ONDANSETRON 4 MG/1
8 TABLET, ORALLY DISINTEGRATING ORAL EVERY 8 HOURS PRN
Status: DISCONTINUED | OUTPATIENT
Start: 2025-04-27 | End: 2025-04-28 | Stop reason: HOSPADM

## 2025-04-27 RX ORDER — ACETAMINOPHEN 500 MG
500 TABLET ORAL EVERY 6 HOURS PRN
Status: DISCONTINUED | OUTPATIENT
Start: 2025-04-27 | End: 2025-04-28 | Stop reason: HOSPADM

## 2025-04-27 RX ADMIN — ACETAMINOPHEN 500 MG: 500 TABLET, FILM COATED ORAL at 09:04

## 2025-04-28 NOTE — NURSING
1904- Pt presented to  with c/o lower right abdominal pain and back pain that started this morning. Pt placed in exam room and on TOCO. Heart tones in 160s. Vitals, UA, and SVE performed. Cervix is closed/thick/high. Pt denies any LOF or vaginal bleeding. POC discussed with pt, pt verbalizes understanding.     2023- Notified  of pt complaint, pt OB history, SVE, no ctx, UA results, and vitals. Orders given to get a cathed urine sample and call back with results.     2131- Notified  of recent UA results. Orders given to d/c patient.     2140- Discharge education given to pt. Instructed pt to be evaluated in ER if pain persists or worsens. Pt verbalizes understanding and has no further questions at this time.     2144- Pt ambulated from  in stable condition.

## 2025-04-29 ENCOUNTER — LAB VISIT (OUTPATIENT)
Dept: LAB | Facility: HOSPITAL | Age: 25
End: 2025-04-29
Attending: OBSTETRICS & GYNECOLOGY
Payer: MEDICAID

## 2025-04-29 ENCOUNTER — ROUTINE PRENATAL (OUTPATIENT)
Dept: OBSTETRICS AND GYNECOLOGY | Facility: CLINIC | Age: 25
End: 2025-04-29
Payer: MEDICAID

## 2025-04-29 VITALS
DIASTOLIC BLOOD PRESSURE: 70 MMHG | SYSTOLIC BLOOD PRESSURE: 120 MMHG | WEIGHT: 188.38 LBS | BODY MASS INDEX: 32.85 KG/M2

## 2025-04-29 DIAGNOSIS — O99.210 OBESITY IN PREGNANCY, ANTEPARTUM: ICD-10-CM

## 2025-04-29 DIAGNOSIS — O36.4XX0 IUFD AT 20 WEEKS OR MORE OF GESTATION: Primary | ICD-10-CM

## 2025-04-29 DIAGNOSIS — Z3A.19 19 WEEKS GESTATION OF PREGNANCY: ICD-10-CM

## 2025-04-29 DIAGNOSIS — Z3A.16 16 WEEKS GESTATION OF PREGNANCY: ICD-10-CM

## 2025-04-29 PROCEDURE — 82105 ALPHA-FETOPROTEIN SERUM: CPT

## 2025-04-29 PROCEDURE — 36415 COLL VENOUS BLD VENIPUNCTURE: CPT

## 2025-04-29 RX ORDER — NAPROXEN SODIUM 220 MG/1
81 TABLET, FILM COATED ORAL DAILY
COMMUNITY

## 2025-04-30 ENCOUNTER — TELEPHONE (OUTPATIENT)
Dept: OBSTETRICS AND GYNECOLOGY | Facility: CLINIC | Age: 25
End: 2025-04-30
Payer: MEDICAID

## 2025-04-30 LAB
# FETUSES: 1
2ND TRIMESTER 4 SCREEN SERPL-IMP: ABNORMAL
AFP ADJ MOM SERPL: 2.77 MOM
AFP SERPL IA-MCNC: 121.1 NG/ML
AGE AT DELIVERY: ABNORMAL
BACTERIA UR CULT: NORMAL
CHORION TYPE: ABNORMAL
COLLECT DATE: ABNORMAL
CURRENT SMOKER: ABNORMAL
GA EST FROM LMP: ABNORMAL WK,D
GA METHOD: ABNORMAL
HX OF NTD QL: NO
HX OF NTD QL: NO
IDDM PATIENT QL: NO
IVF PREGNANCY: NO
LABORATORY COMMENT REPORT: ABNORMAL
M PHYSICIAN PHONE NUMBER: ABNORMAL
MATERNAL RISK FACTORS: ABNORMAL
NEURAL TUBE DEFECT RISK FETUS: ABNORMAL %
RECOM F/U: ABNORMAL
TEST PERFORMANCE INFO SPEC: ABNORMAL

## 2025-04-30 NOTE — TELEPHONE ENCOUNTER
----- Message from Nishi sent at 4/30/2025  1:10 PM CDT -----  Regarding: Call Back  Type:  Patient Returning CallWho Called:Who Left Message for Patient:Does the patient know what this is regarding?:Would the patient rather a call back or a response via [employer]? Best Call Back Number:[phone] Information: Calling in regards to abnormal AFP result

## 2025-04-30 NOTE — TELEPHONE ENCOUNTER
Tried calling pt to schedule her per Dr Joiner x 2 times but recording states that pt can't accept calls & no v/m has been set up

## 2025-04-30 NOTE — TELEPHONE ENCOUNTER
----- Message from Nishi sent at 4/30/2025  1:10 PM CDT -----  Regarding: Call Back  Type:  Patient Returning CallWho Called:Who Left Message for Patient:Does the patient know what this is regarding?:Would the patient rather a call back or a response via [employer]? Best Call Back Number:[phone] Information: Calling in regards to abnormal AFP result

## 2025-05-01 NOTE — TELEPHONE ENCOUNTER
Spoke with patient. Offered appointment next week to discuss results, pt desires. Call transferred to [facility] to schedule

## 2025-05-06 ENCOUNTER — PATIENT MESSAGE (OUTPATIENT)
Dept: OTHER | Facility: OTHER | Age: 25
End: 2025-05-06
Payer: MEDICAID

## 2025-05-06 NOTE — H&P (VIEW-ONLY)
History and Physical      Chief Complaint:  Routine Prenatal Visit    History of Present Illness:  Katerin Chen is a 25 y.o. year old  at 20w1d presents to discuss MSAFP results. No complaints today.        Review of Systems:  General/Constitutional: Fever denies .    Skin: Rash denies.   Respiratory: Cough denies. SOB denies. Wheezing denies .   Cardiovascular: Chest pain denies. Dizziness denies. Palpitations denies. Swelling in hands/feet denies    Gastrointestinal: Abdominal pain denies. Constipation denies. Diarrhea denies. Heartburn denies. Nausea denies. Vomiting denies    Genitourinary: Painful urination denies.   Gynecologic: Vaginal bleeding denies. Vaginal discharge Normal. Leaking amniotic fluid denies. Contractions denies    Psychiatric: Depressed mood denies. Suicidal thoughts denies.     OB History    Para Term  AB Living   4 2 1 1 1 1   SAB IAB Ectopic Multiple Live Births   1 0 0 0 1      # 1 - Date: 2021, Sex: None, Weight: None, GA: 7w0d, Type: Spontaneous , Apgar1: None, Apgar5: None, Living: Fetal Demise, Birth Comments: None    # 2 - Date: 21, Sex: Female, Weight: 3.345 kg (7 lb 6 oz), GA: 38w4d, Type: Vaginal, Spontaneous, Apgar1: None, Apgar5: None, Living: Living, Birth Comments: None    # 3 - Date: 23, Sex: Female, Weight: 0.215 kg (7.6 oz), GA: 23w0d, Type: Vaginal, Spontaneous, Apgar1: 0, Apgar5: 0, Living: Fetal Demise, Birth Comments: None    # 4 - Date: None, Sex: None, Weight: None, GA: None, Type: None, Apgar1: None, Apgar5: None, Living: None, Birth Comments: None      Past Medical History:   Diagnosis Date    ADHD     Anxiety disorder, unspecified     Mental disorder      Current Medications[1]    Review of patient's allergies indicates:  No Known Allergies    Past Surgical History:   Procedure Laterality Date    TONSILLECTOMY Bilateral      Family History   Problem Relation Name Age of Onset    Lung cancer Maternal Grandfather   "    Diabetes Father      Heart disease Father      Breast cancer Neg Hx      Colon cancer Neg Hx      Ovarian cancer Neg Hx      Uterine cancer Neg Hx      Cervical cancer Neg Hx       Social History[2]    Physical Exam:  /72 (BP Location: Right arm, Patient Position: Sitting)   Ht 5' 3.5" (1.613 m)   Wt 85.8 kg (189 lb 3.2 oz)   LMP 12/17/2024   BMI 32.99 kg/m²       Chaperone present    Constitutional: General appearance: healthy, well-nourished and well-developed   Psychiatric:  Orientation to time, place and person. Normal mood and affect and active, alert  Cardiovascular: RRR, no murmurs, gallops or rub  Respiratory: clear to auscultate bilaterally, no wheezes, rales, or rhonchi  Abdomen: Auscultation/Inspection/Palpation: No tenderness or masses. Soft, nondistended   Extremities: no CCE    No FHT present      ABO/Rh:   Lab Results   Component Value Date/Time    GROUPTRH A POS 05/07/2025 01:20 PM    ABORH A POS 07/10/2024 10:50 AM    ABSCREEN NEG 07/10/2024 10:50 AM     H&H:  Lab Results   Component Value Date/Time    HGB 14.3 05/07/2025 01:20 PM    HCT 41.0 05/07/2025 01:20 PM     Platelet:  Lab Results   Component Value Date/Time     05/07/2025 01:20 PM     Osulivan: No results found for: "OGJP5VE", "MJKZ5IV", "IAFD2NO"  HIV:   Lab Results   Component Value Date/Time    HIV Nonreactive 02/24/2025 03:26 PM     RPR:  Lab Results   Component Value Date/Time    SYPHAB Nonreactive 02/24/2025 03:26 PM     Hepatitis B Surface Antigen:  Lab Results   Component Value Date/Time    HEPBSAG Negative 02/24/2025 03:26 PM     Hepatitis C:  Lab Results   Component Value Date/Time    HEPCAB Nonreactive 02/24/2025 03:26 PM     Rubella Immune Status:  Lab Results   Component Value Date/Time    RUBELLAIGG 117.00 05/07/2025 01:18 PM     Chlamydia:  Lab Results   Component Value Date/Time    CTRNA Negative 01/29/2025 01:00 PM     Gonorrhea:  Lab Results   Component Value Date/Time    FACVSPECIMEN Negative " "01/29/2025 01:00 PM      Trichomoniasis:  Lab Results   Component Value Date/Time    TRVGRNA Negative 01/29/2025 01:00 PM      GBS:No results found for: "SREPBPCR", "STREPBCULT", "STREPONLY"   Last PAP Date: 7/16/2024    MSAFP  Specimen collection date 04/29/25   Maternal date of birth [birth year]   Calculated age at GUERRERO 25 years   Maternal Weight 189   GUERRERO by LMP 09/23/25   GA on collection by dates 19,0   GA used in risk estimate Dates estimate   .1   Results Summary Abnormal risk Abnormal    Neural tube defect risk estimate 1/140   AFP MoM 2.77 High        Assessment/Plan:  1. IUFD at 20 weeks or more of gestation, abnl MSAFP  h/o IUFD @ 23wks (9/2024)  2. 20 weeks gestation of pregnancy  Educated   FHT absent today  Breech presentation on US today  Discussed with patient conservative management vs medical w/ cytotec vs surgical management (D&E)  Pt desires induction  Pt to hospital for confirmation US and induction  IUFD labs         APS 9/4/24   Kleihauer betke stain: negative  Toxo IgG/IgM: not drawn  Parvo: negative  CMV: negative  APS: lupus anticoagulant (not detected), beta-2 glycoprotein (<9.4), antithrmbin 3 (79)  DRVVT: no evidence of lupus anticoagulant  A1c: 4.7  TSH: 3.490  Placenta: benign  Prothrombin (F2) pathogenic variant: negative       Future Appointments   Date Time Provider Department Center   5/27/2025  9:20 AM [facility]   7/15/2025  1:00 PM [facility]       This note was transcribed by Ofe Chavarria MA. There may be transcription errors as a result, however minimal. I agree with transcription and every effort has been made to ensure accuracy of transcription, but any obvious errors or omissions should be clarified with the author of the document.               [1]   Current Outpatient Medications:     aspirin 81 MG Chew, Take 81 mg by mouth once daily., Disp: , Rfl:     prenatal vit no.124/iron/folic (PRENATAL VITAMIN ORAL), " Take 1 tablet by mouth Daily., Disp: , Rfl:   [2]   Social History  Socioeconomic History    Marital status: Single   Tobacco Use    Smoking status: Former     Types: Vaping with nicotine     Start date:      Quit date: 2024     Years since quittin.0     Passive exposure: Never    Smokeless tobacco: Never   Substance and Sexual Activity    Alcohol use: Not Currently    Drug use: Never    Sexual activity: Yes     Partners: Male     Birth control/protection: None     Comment: STI: +TV, +CT     Social Drivers of Health     Financial Resource Strain: Low Risk  (2024)    Overall Financial Resource Strain (CARDIA)     Difficulty of Paying Living Expenses: Not very hard   Food Insecurity: No Food Insecurity (2024)    Hunger Vital Sign     Worried About Running Out of Food in the Last Year: Never true     Ran Out of Food in the Last Year: Never true   Transportation Needs: No Transportation Needs (2024)    TRANSPORTATION NEEDS     Transportation : No   Physical Activity: Inactive (2024)    Exercise Vital Sign     Days of Exercise per Week: 0 days     Minutes of Exercise per Session: 0 min   Stress: Stress Concern Present (2024)    [employer] [place] [employer] - Occupational Stress Questionnaire     Feeling of Stress : To some extent   Housing Stability: Unknown (2024)    Housing Stability Vital Sign     Unable to Pay for Housing in the Last Year: No     Homeless in the Last Year: No

## 2025-05-06 NOTE — H&P
History and Physical      Chief Complaint:  Routine Prenatal Visit    History of Present Illness:  Katerin Chen is a 25 y.o. year old  at 20w1d presents to discuss MSAFP results. No complaints today.        Review of Systems:  General/Constitutional: Fever denies .    Skin: Rash denies.   Respiratory: Cough denies. SOB denies. Wheezing denies .   Cardiovascular: Chest pain denies. Dizziness denies. Palpitations denies. Swelling in hands/feet denies    Gastrointestinal: Abdominal pain denies. Constipation denies. Diarrhea denies. Heartburn denies. Nausea denies. Vomiting denies    Genitourinary: Painful urination denies.   Gynecologic: Vaginal bleeding denies. Vaginal discharge Normal. Leaking amniotic fluid denies. Contractions denies    Psychiatric: Depressed mood denies. Suicidal thoughts denies.     OB History    Para Term  AB Living   4 2 1 1 1 1   SAB IAB Ectopic Multiple Live Births   1 0 0 0 1      # 1 - Date: 2021, Sex: None, Weight: None, GA: 7w0d, Type: Spontaneous , Apgar1: None, Apgar5: None, Living: Fetal Demise, Birth Comments: None    # 2 - Date: 22, Sex: Female, Weight: 3.345 kg (7 lb 6 oz), GA: 38w4d, Type: Vaginal, Spontaneous, Apgar1: None, Apgar5: None, Living: Living, Birth Comments: None    # 3 - Date: 24, Sex: Female, Weight: 0.215 kg (7.6 oz), GA: 23w0d, Type: Vaginal, Spontaneous, Apgar1: 0, Apgar5: 0, Living: Fetal Demise, Birth Comments: None    # 4 - Date: None, Sex: None, Weight: None, GA: None, Type: None, Apgar1: None, Apgar5: None, Living: None, Birth Comments: None      Past Medical History:   Diagnosis Date    ADHD     Anxiety disorder, unspecified     Mental disorder      Current Medications[1]    Review of patient's allergies indicates:  No Known Allergies    Past Surgical History:   Procedure Laterality Date    TONSILLECTOMY Bilateral      Family History   Problem Relation Name Age of Onset    Lung cancer Maternal Grandfather   "    Diabetes Father      Heart disease Father      Breast cancer Neg Hx      Colon cancer Neg Hx      Ovarian cancer Neg Hx      Uterine cancer Neg Hx      Cervical cancer Neg Hx       Social History[2]    Physical Exam:  /72 (BP Location: Right arm, Patient Position: Sitting)   Ht 5' 3.5" (1.613 m)   Wt 85.8 kg (189 lb 3.2 oz)   LMP 12/17/2024   BMI 32.99 kg/m²       Chaperone present    Constitutional: General appearance: healthy, well-nourished and well-developed   Psychiatric:  Orientation to time, place and person. Normal mood and affect and active, alert  Cardiovascular: RRR, no murmurs, gallops or rub  Respiratory: clear to auscultate bilaterally, no wheezes, rales, or rhonchi  Abdomen: Auscultation/Inspection/Palpation: No tenderness or masses. Soft, nondistended   Extremities: no CCE    No FHT present      ABO/Rh:   Lab Results   Component Value Date/Time    GROUPTRH A POS 05/07/2025 01:20 PM    ABORH A POS 07/10/2024 10:50 AM    ABSCREEN NEG 07/10/2024 10:50 AM     H&H:  Lab Results   Component Value Date/Time    HGB 14.3 05/07/2025 01:20 PM    HCT 41.0 05/07/2025 01:20 PM     Platelet:  Lab Results   Component Value Date/Time     05/07/2025 01:20 PM     Osulivan: No results found for: "QDGV9FQ", "ZXTH9CR", "JTSI0AH"  HIV:   Lab Results   Component Value Date/Time    HIV Nonreactive 02/24/2025 03:26 PM     RPR:  Lab Results   Component Value Date/Time    SYPHAB Nonreactive 02/24/2025 03:26 PM     Hepatitis B Surface Antigen:  Lab Results   Component Value Date/Time    HEPBSAG Negative 02/24/2025 03:26 PM     Hepatitis C:  Lab Results   Component Value Date/Time    HEPCAB Nonreactive 02/24/2025 03:26 PM     Rubella Immune Status:  Lab Results   Component Value Date/Time    RUBELLAIGG 117.00 05/07/2025 01:18 PM     Chlamydia:  Lab Results   Component Value Date/Time    CTRNA Negative 01/29/2025 01:00 PM     Gonorrhea:  Lab Results   Component Value Date/Time    FACVSPECIMEN Negative " "01/29/2025 01:00 PM      Trichomoniasis:  Lab Results   Component Value Date/Time    TRVGRNA Negative 01/29/2025 01:00 PM      GBS:No results found for: "SREPBPCR", "STREPBCULT", "STREPONLY"   Last PAP Date: 7/16/2024    MSAFP  Specimen collection date 04/29/25   Maternal date of birth [birth year]   Calculated age at GUERRERO 25 years   Maternal Weight 189   GUERRERO by LMP 09/23/25   GA on collection by dates 19,0   GA used in risk estimate Dates estimate   .1   Results Summary Abnormal risk Abnormal    Neural tube defect risk estimate 1/140   AFP MoM 2.77 High        Assessment/Plan:  1. IUFD at 20 weeks or more of gestation, abnl MSAFP  h/o IUFD @ 23wks (9/2024)  2. 20 weeks gestation of pregnancy  Educated   FHT absent today  Breech presentation on US today  Discussed with patient conservative management vs medical w/ cytotec vs surgical management (D&E)  Pt desires induction  Pt to hospital for confirmation US and induction  IUFD labs         APS 9/4/24   Kleihauer betke stain: negative  Toxo IgG/IgM: not drawn  Parvo: negative  CMV: negative  APS: lupus anticoagulant (not detected), beta-2 glycoprotein (<9.4), antithrmbin 3 (79)  DRVVT: no evidence of lupus anticoagulant  A1c: 4.7  TSH: 3.490  Placenta: benign  Prothrombin (F2) pathogenic variant: negative       Future Appointments   Date Time Provider Department Center   5/27/2025  9:20 AM [facility]   7/15/2025  1:00 PM [facility]       This note was transcribed by Ofe Chavarria MA. There may be transcription errors as a result, however minimal. I agree with transcription and every effort has been made to ensure accuracy of transcription, but any obvious errors or omissions should be clarified with the author of the document.               [1]   Current Outpatient Medications:     aspirin 81 MG Chew, Take 81 mg by mouth once daily., Disp: , Rfl:     prenatal vit no.124/iron/folic (PRENATAL VITAMIN ORAL), " Take 1 tablet by mouth Daily., Disp: , Rfl:   [2]   Social History  Socioeconomic History    Marital status: Single   Tobacco Use    Smoking status: Former     Types: Vaping with nicotine     Start date:      Quit date: 2024     Years since quittin.0     Passive exposure: Never    Smokeless tobacco: Never   Substance and Sexual Activity    Alcohol use: Not Currently    Drug use: Never    Sexual activity: Yes     Partners: Male     Birth control/protection: None     Comment: STI: +TV, +CT     Social Drivers of Health     Financial Resource Strain: Low Risk  (2024)    Overall Financial Resource Strain (CARDIA)     Difficulty of Paying Living Expenses: Not very hard   Food Insecurity: No Food Insecurity (2024)    Hunger Vital Sign     Worried About Running Out of Food in the Last Year: Never true     Ran Out of Food in the Last Year: Never true   Transportation Needs: No Transportation Needs (2024)    TRANSPORTATION NEEDS     Transportation : No   Physical Activity: Inactive (2024)    Exercise Vital Sign     Days of Exercise per Week: 0 days     Minutes of Exercise per Session: 0 min   Stress: Stress Concern Present (2024)    Citizen of [place] [employer] - Occupational Stress Questionnaire     Feeling of Stress : To some extent   Housing Stability: Unknown (2024)    Housing Stability Vital Sign     Unable to Pay for Housing in the Last Year: No     Homeless in the Last Year: No

## 2025-05-07 ENCOUNTER — ROUTINE PRENATAL (OUTPATIENT)
Dept: OBSTETRICS AND GYNECOLOGY | Facility: CLINIC | Age: 25
End: 2025-05-07
Payer: MEDICAID

## 2025-05-07 ENCOUNTER — HOSPITAL ENCOUNTER (INPATIENT)
Facility: HOSPITAL | Age: 25
LOS: 1 days | Discharge: HOME | End: 2025-05-08
Attending: OBSTETRICS & GYNECOLOGY | Admitting: OBSTETRICS & GYNECOLOGY
Payer: MEDICAID

## 2025-05-07 VITALS
WEIGHT: 189.19 LBS | BODY MASS INDEX: 32.3 KG/M2 | HEIGHT: 64 IN | SYSTOLIC BLOOD PRESSURE: 118 MMHG | DIASTOLIC BLOOD PRESSURE: 72 MMHG

## 2025-05-07 DIAGNOSIS — O36.4XX0 IUFD AT 20 WEEKS OR MORE OF GESTATION: Primary | ICD-10-CM

## 2025-05-07 DIAGNOSIS — O36.4XX0 IUFD AT 20 WEEKS OR MORE OF GESTATION: ICD-10-CM

## 2025-05-07 DIAGNOSIS — Z3A.20 20 WEEKS GESTATION OF PREGNANCY: ICD-10-CM

## 2025-05-07 PROBLEM — Z3A.22 22 WEEKS GESTATION OF PREGNANCY: Status: RESOLVED | Noted: 2024-09-03 | Resolved: 2025-05-07

## 2025-05-07 PROBLEM — Z87.59 HISTORY OF IUFD: Status: ACTIVE | Noted: 2025-05-07

## 2025-05-07 LAB
APTT PPP: 24.4 SECONDS (ref 23–29.4)
BASOPHILS # BLD AUTO: 0.02 X10(3)/MCL (ref 0.01–0.08)
BASOPHILS NFR BLD AUTO: 0.2 % (ref 0.1–1.2)
D DIMER PPP IA.FEU-MCNC: 1.17 MG/L FEU (ref 0.19–0.5)
EOSINOPHIL # BLD AUTO: 0.04 X10(3)/MCL (ref 0.04–0.36)
EOSINOPHIL NFR BLD AUTO: 0.4 % (ref 0.7–7)
ERYTHROCYTE [DISTWIDTH] IN BLOOD BY AUTOMATED COUNT: 12.2 % (ref 11–14.5)
EST. AVERAGE GLUCOSE BLD GHB EST-MCNC: 91.1 MG/DL (ref 70–115)
FIBRINOGEN PPP-MCNC: 486 MG/DL (ref 210–463)
GROUP & RH: NORMAL
HBA1C MFR BLD: 4.8 % (ref 4–6)
HCT VFR BLD AUTO: 41 % (ref 36–48)
HGB BLD-MCNC: 14.3 G/DL (ref 11.8–16)
IMM GRANULOCYTES # BLD AUTO: 0.05 X10(3)/MCL (ref 0–0.03)
IMM GRANULOCYTES NFR BLD AUTO: 0.5 % (ref 0–0.5)
INDIRECT COOMBS: NORMAL
INR PPP: 1
KLEIHAUER-BETKE STAIN: NORMAL
LYMPHOCYTES # BLD AUTO: 2.05 X10(3)/MCL (ref 1.16–3.74)
LYMPHOCYTES NFR BLD AUTO: 19.6 % (ref 20–55)
MCH RBC QN AUTO: 32.6 PG (ref 27–34)
MCHC RBC AUTO-ENTMCNC: 34.9 G/DL (ref 31–37)
MCV RBC AUTO: 93.6 FL (ref 79–99)
MONOCYTES # BLD AUTO: 0.56 X10(3)/MCL (ref 0.24–0.36)
MONOCYTES NFR BLD AUTO: 5.4 % (ref 4.7–12.5)
NEUTROPHILS # BLD AUTO: 7.72 X10(3)/MCL (ref 1.56–6.13)
NEUTROPHILS NFR BLD AUTO: 73.9 % (ref 37–73)
NRBC BLD AUTO-RTO: 0 %
PLATELET # BLD AUTO: 225 X10(3)/MCL (ref 140–371)
PMV BLD AUTO: 11.1 FL (ref 9.4–12.4)
PROTHROMBIN TIME: 10.1 SECONDS (ref 9.3–11.9)
RBC # BLD AUTO: 4.38 X10(6)/MCL (ref 4–5.1)
RUBELLA AB, IGG (OLG): 117 IU/ML
SPECIMEN OUTDATE: NORMAL
TT IMM BOVINE THROMBIN PPP: 17 SECONDS (ref 0–22)
WBC # BLD AUTO: 10.44 X10(3)/MCL (ref 4–11.5)

## 2025-05-07 PROCEDURE — 76815 OB US LIMITED FETUS(S): CPT | Mod: ,,, | Performed by: OBSTETRICS & GYNECOLOGY

## 2025-05-07 PROCEDURE — 83036 HEMOGLOBIN GLYCOSYLATED A1C: CPT | Performed by: OBSTETRICS & GYNECOLOGY

## 2025-05-07 PROCEDURE — 25000003 PHARM REV CODE 250: Performed by: OBSTETRICS & GYNECOLOGY

## 2025-05-07 PROCEDURE — 85384 FIBRINOGEN ACTIVITY: CPT | Performed by: OBSTETRICS & GYNECOLOGY

## 2025-05-07 PROCEDURE — 85610 PROTHROMBIN TIME: CPT | Performed by: OBSTETRICS & GYNECOLOGY

## 2025-05-07 PROCEDURE — 86762 RUBELLA ANTIBODY: CPT | Performed by: OBSTETRICS & GYNECOLOGY

## 2025-05-07 PROCEDURE — 84443 ASSAY THYROID STIM HORMONE: CPT | Performed by: OBSTETRICS & GYNECOLOGY

## 2025-05-07 PROCEDURE — 99214 OFFICE O/P EST MOD 30 MIN: CPT | Mod: TH,,, | Performed by: OBSTETRICS & GYNECOLOGY

## 2025-05-07 PROCEDURE — 11000001 HC ACUTE MED/SURG PRIVATE ROOM

## 2025-05-07 PROCEDURE — 86901 BLOOD TYPING SEROLOGIC RH(D): CPT | Performed by: OBSTETRICS & GYNECOLOGY

## 2025-05-07 PROCEDURE — 85379 FIBRIN DEGRADATION QUANT: CPT | Performed by: OBSTETRICS & GYNECOLOGY

## 2025-05-07 PROCEDURE — 85613 RUSSELL VIPER VENOM DILUTED: CPT | Performed by: OBSTETRICS & GYNECOLOGY

## 2025-05-07 PROCEDURE — 81241 F5 GENE: CPT | Performed by: OBSTETRICS & GYNECOLOGY

## 2025-05-07 PROCEDURE — 86644 CMV ANTIBODY: CPT | Performed by: OBSTETRICS & GYNECOLOGY

## 2025-05-07 PROCEDURE — 86747 PARVOVIRUS ANTIBODY: CPT | Performed by: OBSTETRICS & GYNECOLOGY

## 2025-05-07 PROCEDURE — 86146 BETA-2 GLYCOPROTEIN ANTIBODY: CPT | Performed by: OBSTETRICS & GYNECOLOGY

## 2025-05-07 PROCEDURE — 86777 TOXOPLASMA ANTIBODY: CPT | Performed by: OBSTETRICS & GYNECOLOGY

## 2025-05-07 PROCEDURE — 36415 COLL VENOUS BLD VENIPUNCTURE: CPT | Performed by: OBSTETRICS & GYNECOLOGY

## 2025-05-07 PROCEDURE — 86147 CARDIOLIPIN ANTIBODY EA IG: CPT | Performed by: OBSTETRICS & GYNECOLOGY

## 2025-05-07 PROCEDURE — 63600175 PHARM REV CODE 636 W HCPCS: Performed by: OBSTETRICS & GYNECOLOGY

## 2025-05-07 PROCEDURE — 85300 ANTITHROMBIN III ACTIVITY: CPT | Performed by: OBSTETRICS & GYNECOLOGY

## 2025-05-07 PROCEDURE — 85025 COMPLETE CBC W/AUTO DIFF WBC: CPT | Performed by: OBSTETRICS & GYNECOLOGY

## 2025-05-07 PROCEDURE — 85670 THROMBIN TIME PLASMA: CPT | Performed by: OBSTETRICS & GYNECOLOGY

## 2025-05-07 PROCEDURE — 85730 THROMBOPLASTIN TIME PARTIAL: CPT | Performed by: OBSTETRICS & GYNECOLOGY

## 2025-05-07 RX ORDER — IBUPROFEN 600 MG/1
600 TABLET, FILM COATED ORAL EVERY 6 HOURS PRN
Status: DISCONTINUED | OUTPATIENT
Start: 2025-05-07 | End: 2025-05-08 | Stop reason: HOSPADM

## 2025-05-07 RX ORDER — MISOPROSTOL 100 UG/1
800 TABLET ORAL ONCE
Status: COMPLETED | OUTPATIENT
Start: 2025-05-07 | End: 2025-05-07

## 2025-05-07 RX ORDER — MISOPROSTOL 100 UG/1
400 TABLET ORAL EVERY 4 HOURS PRN
Status: CANCELLED | OUTPATIENT
Start: 2025-05-07

## 2025-05-07 RX ORDER — MISOPROSTOL 100 UG/1
800 TABLET ORAL ONCE
Status: CANCELLED | OUTPATIENT
Start: 2025-05-07 | End: 2025-05-07

## 2025-05-07 RX ORDER — ACETAMINOPHEN 325 MG/1
650 TABLET ORAL EVERY 6 HOURS PRN
Status: DISCONTINUED | OUTPATIENT
Start: 2025-05-07 | End: 2025-05-08 | Stop reason: HOSPADM

## 2025-05-07 RX ORDER — SODIUM CHLORIDE, SODIUM LACTATE, POTASSIUM CHLORIDE, CALCIUM CHLORIDE 600; 310; 30; 20 MG/100ML; MG/100ML; MG/100ML; MG/100ML
INJECTION, SOLUTION INTRAVENOUS CONTINUOUS
Status: DISCONTINUED | OUTPATIENT
Start: 2025-05-07 | End: 2025-05-08 | Stop reason: HOSPADM

## 2025-05-07 RX ORDER — DIPHENOXYLATE HYDROCHLORIDE AND ATROPINE SULFATE 2.5; .025 MG/1; MG/1
1 TABLET ORAL 4 TIMES DAILY PRN
Status: DISCONTINUED | OUTPATIENT
Start: 2025-05-07 | End: 2025-05-08 | Stop reason: HOSPADM

## 2025-05-07 RX ORDER — MISOPROSTOL 100 UG/1
400 TABLET ORAL EVERY 4 HOURS PRN
Status: DISCONTINUED | OUTPATIENT
Start: 2025-05-07 | End: 2025-05-08 | Stop reason: HOSPADM

## 2025-05-07 RX ORDER — HYDROXYZINE PAMOATE 50 MG/1
50 CAPSULE ORAL EVERY 8 HOURS PRN
Status: DISCONTINUED | OUTPATIENT
Start: 2025-05-07 | End: 2025-05-08 | Stop reason: HOSPADM

## 2025-05-07 RX ORDER — ONDANSETRON 4 MG/1
4 TABLET, ORALLY DISINTEGRATING ORAL EVERY 8 HOURS PRN
Status: DISCONTINUED | OUTPATIENT
Start: 2025-05-07 | End: 2025-05-08 | Stop reason: HOSPADM

## 2025-05-07 RX ADMIN — ACETAMINOPHEN 650 MG: 325 TABLET, FILM COATED ORAL at 10:05

## 2025-05-07 RX ADMIN — MISOPROSTOL 800 MCG: 100 TABLET ORAL at 01:05

## 2025-05-07 RX ADMIN — SODIUM CHLORIDE, POTASSIUM CHLORIDE, SODIUM LACTATE AND CALCIUM CHLORIDE: 600; 310; 30; 20 INJECTION, SOLUTION INTRAVENOUS at 03:05

## 2025-05-07 RX ADMIN — HYDROXYZINE PAMOATE 50 MG: 50 CAPSULE ORAL at 10:05

## 2025-05-07 RX ADMIN — DIPHENOXYLATE HYDROCHLORIDE AND ATROPINE SULFATE 1 TABLET: 2.5; .025 TABLET ORAL at 10:05

## 2025-05-07 RX ADMIN — IBUPROFEN 600 MG: 600 TABLET, FILM COATED ORAL at 05:05

## 2025-05-07 RX ADMIN — MISOPROSTOL 400 MCG: 100 TABLET ORAL at 05:05

## 2025-05-07 NOTE — INTERVAL H&P NOTE
The patient has been examined and the H&P has been reviewed:    I concur with the findings and no changes have occurred since H&P was written.        Active Hospital Problems    Diagnosis  POA    *IUFD at 20 weeks or more of gestation [O36.4XX0]  Yes    20 weeks gestation of pregnancy [Z3A.20]  Not Applicable    History of IUFD [Z87.59]  Not Applicable      Resolved Hospital Problems   No resolved problems to display.       Admit  Continuous toco  monitoring   ASVD  Epidural if desires   I am aware of the plan of care prior to the administration of CRNA anesthesia services.

## 2025-05-08 ENCOUNTER — ANESTHESIA (OUTPATIENT)
Dept: OBSTETRICS AND GYNECOLOGY | Facility: HOSPITAL | Age: 25
End: 2025-05-08
Payer: MEDICAID

## 2025-05-08 ENCOUNTER — ANESTHESIA EVENT (OUTPATIENT)
Dept: OBSTETRICS AND GYNECOLOGY | Facility: HOSPITAL | Age: 25
End: 2025-05-08
Payer: MEDICAID

## 2025-05-08 VITALS
HEIGHT: 64 IN | WEIGHT: 189.13 LBS | DIASTOLIC BLOOD PRESSURE: 62 MMHG | RESPIRATION RATE: 18 BRPM | TEMPERATURE: 98 F | OXYGEN SATURATION: 96 % | BODY MASS INDEX: 32.29 KG/M2 | SYSTOLIC BLOOD PRESSURE: 110 MMHG | HEART RATE: 65 BPM

## 2025-05-08 LAB
B2 GLYCOPROT1 IGG SERPL IA-ACNC: <9.4 SGU
B2 GLYCOPROT1 IGM SERPL IA-ACNC: <9.4 SMU
CMV IGG SERPL QL IA: NEGATIVE
T GONDII IGG SER QL IA: POSITIVE
T GONDII IGG SER-ACNC: 166 IU/ML
T GONDII IGM SERPL QL IA: NEGATIVE
TSH SERPL-ACNC: 2.56 UIU/ML (ref 0.35–4.94)

## 2025-05-08 PROCEDURE — 25000003 PHARM REV CODE 250: Performed by: OBSTETRICS & GYNECOLOGY

## 2025-05-08 PROCEDURE — 25000003 PHARM REV CODE 250: Performed by: NURSE ANESTHETIST, CERTIFIED REGISTERED

## 2025-05-08 PROCEDURE — 37000008 HC ANESTHESIA 1ST 15 MINUTES: Performed by: NURSE ANESTHETIST, CERTIFIED REGISTERED

## 2025-05-08 PROCEDURE — 3E0DXGC INTRODUCTION OF OTHER THERAPEUTIC SUBSTANCE INTO MOUTH AND PHARYNX, EXTERNAL APPROACH: ICD-10-PCS | Performed by: OBSTETRICS & GYNECOLOGY

## 2025-05-08 PROCEDURE — 62326 NJX INTERLAMINAR LMBR/SAC: CPT | Performed by: NURSE ANESTHETIST, CERTIFIED REGISTERED

## 2025-05-08 PROCEDURE — 59409 OBSTETRICAL CARE: CPT | Mod: AT,,, | Performed by: OBSTETRICS & GYNECOLOGY

## 2025-05-08 PROCEDURE — 37000009 HC ANESTHESIA EA ADD 15 MINS: Performed by: NURSE ANESTHETIST, CERTIFIED REGISTERED

## 2025-05-08 PROCEDURE — 63600175 PHARM REV CODE 636 W HCPCS: Performed by: OBSTETRICS & GYNECOLOGY

## 2025-05-08 RX ORDER — CETIRIZINE HYDROCHLORIDE 10 MG/1
10 TABLET ORAL DAILY
Status: DISCONTINUED | OUTPATIENT
Start: 2025-05-08 | End: 2025-05-08 | Stop reason: HOSPADM

## 2025-05-08 RX ORDER — FENTANYL CITRATE 50 UG/ML
100 INJECTION, SOLUTION INTRAMUSCULAR; INTRAVENOUS ONCE
Refills: 0 | Status: COMPLETED | OUTPATIENT
Start: 2025-05-08 | End: 2025-05-08

## 2025-05-08 RX ORDER — ROPIVACAINE HYDROCHLORIDE 2 MG/ML
12 INJECTION, SOLUTION EPIDURAL; INFILTRATION; PERINEURAL CONTINUOUS
Status: DISCONTINUED | OUTPATIENT
Start: 2025-05-08 | End: 2025-05-08 | Stop reason: HOSPADM

## 2025-05-08 RX ORDER — DEXMEDETOMIDINE HYDROCHLORIDE 100 UG/ML
5 INJECTION, SOLUTION INTRAVENOUS ONCE
Status: COMPLETED | OUTPATIENT
Start: 2025-05-08 | End: 2025-05-08

## 2025-05-08 RX ORDER — LIDOCAINE HYDROCHLORIDE AND EPINEPHRINE 15; 5 MG/ML; UG/ML
INJECTION, SOLUTION EPIDURAL
Status: DISCONTINUED | OUTPATIENT
Start: 2025-05-08 | End: 2025-05-08

## 2025-05-08 RX ORDER — EPHEDRINE SULFATE 50 MG/ML
INJECTION, SOLUTION INTRAVENOUS
Status: DISCONTINUED | OUTPATIENT
Start: 2025-05-08 | End: 2025-05-08

## 2025-05-08 RX ADMIN — MISOPROSTOL 400 MCG: 100 TABLET ORAL at 06:05

## 2025-05-08 RX ADMIN — ONDANSETRON 4 MG: 4 TABLET, ORALLY DISINTEGRATING ORAL at 10:05

## 2025-05-08 RX ADMIN — DEXMEDETOMIDINE 5 MCG: 200 INJECTION, SOLUTION INTRAVENOUS at 10:05

## 2025-05-08 RX ADMIN — ROPIVACAINE HYDROCHLORIDE 12 ML/HR: 2 INJECTION, SOLUTION EPIDURAL; INFILTRATION at 10:05

## 2025-05-08 RX ADMIN — MISOPROSTOL 400 MCG: 100 TABLET ORAL at 02:05

## 2025-05-08 RX ADMIN — IBUPROFEN 600 MG: 600 TABLET, FILM COATED ORAL at 02:05

## 2025-05-08 RX ADMIN — LIDOCAINE HYDROCHLORIDE AND EPINEPHRINE 3 ML: 15; 5 INJECTION, SOLUTION EPIDURAL at 09:05

## 2025-05-08 RX ADMIN — MISOPROSTOL 400 MCG: 100 TABLET ORAL at 10:05

## 2025-05-08 RX ADMIN — FENTANYL CITRATE 50 MCG: 50 INJECTION, SOLUTION INTRAMUSCULAR; INTRAVENOUS at 09:05

## 2025-05-08 RX ADMIN — ACETAMINOPHEN 650 MG: 325 TABLET, FILM COATED ORAL at 09:05

## 2025-05-08 RX ADMIN — LIDOCAINE HYDROCHLORIDE AND EPINEPHRINE 2 ML: 15; 5 INJECTION, SOLUTION EPIDURAL at 09:05

## 2025-05-08 RX ADMIN — EPHEDRINE SULFATE 15 MG: 50 INJECTION, SOLUTION INTRAVENOUS at 10:05

## 2025-05-08 RX ADMIN — DIPHENOXYLATE HYDROCHLORIDE AND ATROPINE SULFATE 1 TABLET: 2.5; .025 TABLET ORAL at 09:05

## 2025-05-08 NOTE — ANESTHESIA POSTPROCEDURE EVALUATION
Anesthesia Post Evaluation    Patient: Katerin Chen    Procedure(s) Performed: * No procedures listed *    Final Anesthesia Type: CSE      Patient location during evaluation: labor & delivery  Patient participation: Yes- Able to Participate  Level of consciousness: awake and alert, awake and oriented  Post-procedure vital signs: reviewed and stable  Pain management: adequate  Airway patency: patent    PONV status at discharge: No PONV  Anesthetic complications: no      Cardiovascular status: blood pressure returned to baseline  Respiratory status: unassisted, room air and spontaneous ventilation  Hydration status: euvolemic  Follow-up not needed.              Vitals Value Taken Time   /55 05/08/25 16:21   Temp 38.2 °C (100.8 °F) 05/08/25 10:00   Pulse 89 05/08/25 16:22   Resp 18 05/08/25 06:03   SpO2 97 % 05/08/25 16:22   Vitals shown include unfiled device data.      No case tracking events are documented in the log.      Pain/Tino Score: Pain Rating Prior to Med Admin: 0 (5/8/2025  9:17 AM)  Pain Rating Post Med Admin: 0 (5/7/2025 10:16 PM)

## 2025-05-08 NOTE — NURSING
"1350  Father called nursing staff into the room, stating "she thinks she is bleeding". 2 RN entered room, upon assessment, baby noted to be expelled from vagina remaining partially in amniotic bag on the bed.   Explained to parents that she was feeling the pressure of baby delivering. Dr. Joiner notified.   Cord clamped, allowed father to cut cord  Allow placenta to deliver on its own, LD nurse will continue to monitor    1400  Received verbal consent to take photos and perform bereavement care at bedside, offered involvement. Does not wish to participate at this time     1415  Interventions completed:  -bath  -weight  -length  -photos  -hand / foot prints   -memory box created with maribell     1435  Baby placed in mother's arms and private time offered    1450  , Dr. Mancera notified--will not investigate  GOLDY notified, not suitable for donation   Case workers notified for financial assistance   "

## 2025-05-08 NOTE — NURSING
Parents request to remove baby from room.  Parental rights form explained and signed  Parents wish to cremate baby, sending to [facility]. Request we call  home now to release baby.     Baby brought to nursery, put on ice and placed identification sticker on covering.   [place] and final memory box presented to parents and family.       Attempted to notify  home, no answer. Will try again.

## 2025-05-08 NOTE — NURSING
2130-PT OFF TOCO FOR BREAK UNTIL CYTOTEC ADMINISTRATION RESUMES AT 0200. PT DENIES ANY PAIN AT THIS TIME.

## 2025-05-08 NOTE — ANESTHESIA PREPROCEDURE EVALUATION
05/08/2025  Katerin Chen is a 25 y.o., female.    Surgical History    Procedure Laterality Date Comment Source   TONSILLECTOMY Bilateral        Medical History    Diagnosis Date Comment Source   ADHD      Anxiety disorder, unspecified      Mental disorder        Pre-op Assessment    I have reviewed the Patient Summary Reports.     I have reviewed the Nursing Notes. I have reviewed the NPO Status.   I have reviewed the Medications.     Review of Systems  Anesthesia Hx:  No problems with previous Anesthesia             Denies Family Hx of Anesthesia complications.    Denies Personal Hx of Anesthesia complications.                    Social:  Former Smoker, Vaping       Hematology/Oncology:  Hematology Normal   Oncology Normal                                   EENT/Dental:  EENT/Dental Normal           Cardiovascular:  Cardiovascular Normal Exercise tolerance: good                                             Pulmonary:  Pulmonary Normal                       Renal/:  Renal/ Normal                 Hepatic/GI:  Hepatic/GI Normal                    Musculoskeletal:  Musculoskeletal Normal                OB/GYN/PEDS:  22 WEEKS GESTATION           Neurological:  Neurology Normal                                      Endocrine:  Endocrine Normal          Obesity / BMI > 30  Dermatological:  Skin Normal    Psych:  Psychiatric History anxiety  ADHD               Physical Exam  General: Well nourished, Cooperative, Alert and Oriented    Airway:  Mallampati: II / II  Mouth Opening: Normal  TM Distance: Normal  Tongue: Normal  Neck ROM: Normal ROM    Dental:  Intact        Anesthesia Plan  Type of Anesthesia, risks & benefits discussed:    Anesthesia Type: CSE  Intra-op Monitoring Plan: Standard ASA Monitors  Post Op Pain Control Plan: multimodal analgesia  Induction:  IV  Airway Plan: Direct  Informed Consent:  Informed consent signed with the Patient and all parties understand the risks and agree with anesthesia plan.  All questions answered. Patient consented to blood products? Yes  ASA Score: 2  Day of Surgery Review of History & Physical: H&P Update referred to the surgeon/provider.I have interviewed and examined the patient. I have reviewed the patient's H&P dated: There are no significant changes.     Ready For Surgery From Anesthesia Perspective.     .

## 2025-05-08 NOTE — ANESTHESIA PROCEDURE NOTES
CSE    Patient location during procedure: OB  Start time: 5/8/2025 9:46 AM  Timeout: 5/8/2025 9:45 AM  End time: 5/8/2025 9:55 AM    Reason for block: labor analgesia requested by patient and obstetrician    Staffing  Authorizing Provider: Romario Tang CRNA  Performing Provider: Romario Tang CRNA    Staffing  Performed by: Romario Tang CRNA  Authorized by: Romario Tang CRNA    Preanesthetic Checklist  Completed: patient identified, IV checked, site marked, risks and benefits discussed, surgical consent, monitors and equipment checked, pre-op evaluation and timeout performed  CSE  Patient position: sitting  Prep: ChloraPrep  Patient monitoring: heart rate, continuous pulse ox and frequent blood pressure checks  Approach: midline  Spinal Needle  Needle type: pencil-tip   Needle gauge: 25 G  Needle length: 5 in  Epidural Needle  Injection technique: YESICA air  Needle type: Tuohy   Needle gauge: 17 G  Needle length: 3.5 in  Needle insertion depth: 6 cm  Location: L3-4  Needle localization: anatomical landmarks   Catheter  Catheter type: Mobilio  Catheter size: 18 G  Catheter at skin depth: 10 cm  Test dose: lidocaine 1.5% with Epi 1-to-200,000 and negative  Test dose: 2 mL  Additional Documentation: incremental injection, no paresthesia on injection, negative aspiration for heme, negative aspiration for CSF and negative test dose  Assessment  Sensory level: T10   Dermatomal levels determined by alcohol swab

## 2025-05-08 NOTE — L&D DELIVERY NOTE
"[facility]  Vaginal Delivery   Operative Note    SUMMARY   [facility]  Vaginal Delivery Note      Pre Operative Diagnosis: 20w2d   Active Hospital Problems    Diagnosis  POA    *IUFD at 20 weeks or more of gestation [O36.4XX0]  Yes    20 weeks gestation of pregnancy [Z3A.20]  Not Applicable    History of IUFD [Z87.59]  Not Applicable      Resolved Hospital Problems   No resolved problems to display.       Post Operative Diagnosis: same  Procedure: Spontaneous vaginal delivery  Provider: Marah Joiner MD  Anesthisa: epidural  EBL: 100 cc  Complications: none    Indications:  Katerin Chen is a 25 y.o.  female with IUP at 20w2d  who is being admitted for IUFD at 20 wks.     Procedure in detail: Patient found to be complete.  Anesthesia was found to be adequate.  Fetus was found delivered at 13:50, .   Weight: 263g.  Patient was allowed to Valsalva.  Placenta delivered at 15:22 spontaneous intact with a three-vessel cord.  Following this excellent hemostasis was achieved with IV Pitocin and uterine massage.  Mother doing well.  Infant was placed on mother's chest for skin to skin.     Sponge needle and lap counted correctly x2.    Placenta to pathology           Indications: IUFD at 20 weeks or more of gestation  Pregnancy complicated by: Problem List[1]  Admitting GA: 20w2d    Delivery Information for Girl Katerin Chen    Birth information:  Date of birth:    Time of birth:    Sex: female   Head Delivery Date/Time:     Delivery type:    Gestational Age: 20w2d       Delivery Providers    Delivering clinician:            Measurements    Weight: 263 g  Weight (lbs): 9.3 oz  Length: 21.6 cm  Length (in): 8.5"         Apgars    Living status: Fetal Demise  Apgar Component Scores:  1 min.:  5 min.:  10 min.:  15 min.:  20 min.:    Skin color:  0  0  0  0  0    Heart rate:  0  0  0  0  0    Reflex irritability:  0  0  0  0  0    Muscle tone:  " 0  0  0  0  0    Respiratory effort:  0  0  0  0  0    Total:  0  0  0  0  0                             Interventions/Resuscitation           Cord    No data filed       Placenta    Placenta delivery date/time:   Placenta removal:            Labor Events:       labor:       Labor Onset Date/Time:         Dilation Complete Date/Time:         Start Pushing Date/Time:       Rupture Date/Time:            Rupture type:          Fluid Amount:       Fluid Color:        steroids:       Antibiotics given for GBS:       Induction:       Indications for induction:        Augmentation:       Indications for augmentation:       Labor complications:       Additional complications:          Cervical ripening:                     Delivery:      Episiotomy:       Indication for Episiotomy:       Perineal Lacerations:   Repaired:      Periurethral Laceration:   Repaired:     Labial Laceration:   Repaired:     Sulcus Laceration:   Repaired:     Vaginal Laceration:   Repaired:     Cervical Laceration:   Repaired:     Repair suture:       Repair # of packets:       Last Value - EBL - Nursing (mL):       Sum - EBL - Nursing (mL): 0     Last Value - EBL - Anesthesia (mL):      Calculated QBL (mL):       Running total QBL (mL):       Vaginal Sweep Performed:       Surgicount Correct:         Other providers:            Details (if applicable):  Trial of Labor      Categorization:      Priority:     Indications for :     Incision Type:       Additional  information:  Forceps:    Vacuum:    Breech:    Observed anomalies    Other (Comments):              [1]   Patient Active Problem List  Diagnosis    IUFD at 20 weeks or more of gestation    20 weeks gestation of pregnancy    History of IUFD

## 2025-05-09 LAB
AT III ACT/NOR PPP CHRO: 72 % (ref 80–130)
B19V IGG SER QL IA: NEGATIVE
B19V IGG+IGM SER-IMP: NORMAL
B19V IGM SER QL IA: NEGATIVE
MAYO GENERIC ORDERABLE RESULT: NORMAL
MIXING STUDIES PPP-IMP: NORMAL
SCREEN DRVVT/NORMAL: 1.05 RATIO

## 2025-05-09 NOTE — PROGRESS NOTES
Note for biopsy specimen harvest PROCEDURE:    Asked to harvest tissue from a fetal demise that delivered at 20 weeks 2 day gestation.  Primary obstetrician ordered genetic testing.    Lab requesting harvest of thigh fascia approximately 1 cm and placenta including chorion approximately 1 cm    Specimen had been held on ice in normal saline post delivery.    Placenta laid out on table and using fine scissor dissection approximately 1 cm section including a chorionic vessel was harvested and placed in saline bath for submission to laboratory.  To placental specimens each approximately 1 cm were collected submitted.    Via fascia was requested from the lab-proximally 1 cm of anterior thigh fascia was removed tanning the dissection slightly lateral attempt harvest of iliotibial tibial band.  A 2nd 1 cm length of what appeared to be tenderness/fascial tissue from the lateral aspect of the thigh was also harvested and both placed in a saline bath for submission to the laboratory.    The very thin superficial skin was not submitted with the fascial specimens as the lab had requested fascia tissue not skin.      This concludes the biopsy specimen harvest procedure note:  All harvested specimens will be submitted to the lab in a saline bath as per the labs request.      Dr. Issac Spann

## 2025-05-09 NOTE — PLAN OF CARE
Rec'd call from OB Nursery stating patient had a fetal demise.  I met with the couple and gave my condolences  to both the mother & father. I left the parents literature on [employer] along with an application for assistance with  arrangements. I contacted Li Bower ( [phone] ) with [employer] and she stated she is going to give the patient a call after we hang up , OB Dept informed of all the above.

## 2025-05-09 NOTE — DISCHARGE SUMMARY
Delivery Discharge Summary  Obstetrics        Discharge Provider: Issac Spann MD    Admission date: 2025  Discharge date: 2025    Admit Dx:   Problem List[1]  Discharge Dx:  Spontaneous vaginal delivery of fetal demise 20 week 3 days      Hospital Course:  Katerin Chen is a 25 y.o. now  who was admitted on 2025 for 20 week fetal demise.  Induction of labor with resulting vaginal delivery of complete fetus and placenta.  Please see delivery note for further details.  Her postpartum course was uncomplicated. On the date of discharge, patient's pain is controlled with oral pain medications. She is tolerating ambulation without SOB or CP, and PO diet without N/V. Reported lochia is within the normal range. Pt in stable condition and ready for discharge.       DISCHARGE PHYSICAL EXAM  Temp:  [97.7 °F (36.5 °C)] 97.7 °F (36.5 °C)  Pulse:  [65-82] 65  Resp:  [18] 18  SpO2:  [92 %-100 %] 96 %  BP: ()/(52-62) 110/62    Intake/Output Summary (Last 24 hours) at 2025 1051  Last data filed at 2025 1945  Gross per 24 hour   Intake --   Output 1700 ml   Net -1700 ml     Body mass index is 32.98 kg/m².    General: no acute distress  Respirations: normal and non-labored  Abdomen: soft, non-tender, non-distended; Fundus firm and below the umbilicus    Extremities: non-tender, symmetric, trace BLE edema, neg Joy's Bilateral     Pertinent studies:  Lab Results   Component Value Date/Time    GROUPTRH A POS 2025 01:20 PM    ABORH A POS 07/10/2024 10:50 AM    ABSCREEN NEG 07/10/2024 10:50 AM     Recent Results (from the past 2 weeks)   CBC with Differential    Collection Time: 25  1:20 PM   Result Value Ref Range    WBC 10.44 4.00 - 11.50 x10(3)/mcL    Hgb 14.3 11.8 - 16.0 g/dL    Hct 41.0 36.0 - 48.0 %    Platelet 225 140 - 371 x10(3)/mcL         Disposition: To home, self care    Follow Up: 6 weeks    Patient Instructions:   1. Call the office for any bleeding >2 pads/hour  for >2 hours, temperature >100.4, pain that is uncontrolled with medications, or for any other concerns.  2. Breastfeeding encouraged  3. Complete pelvic rest for 6 weeks  4. Pre Eclampsia precautions  5. No driving while on narcotics.        Trent James Fogleman MD[facility]  Discharge Note  Short Stay    * No surgery found *      OUTCOME: Patient tolerated treatment/procedure well without complication and is now ready for discharge.    DISPOSITION: Home or Self Care    FINAL DIAGNOSIS:  IUFD at 20 weeks or more of gestation    FOLLOWUP: In clinic    DISCHARGE INSTRUCTIONS:  No discharge procedures on file.      Clinical Reference Documents Added to Patient Instructions         Document     LOSS AFTER 20 WEEKS DISCHARGE INSTRUCTIONS (ENGLISH)    POSTPARTUM DEPRESSION DISCHARGE INSTRUCTIONS (ENGLISH)    VAGINAL DELIVERY DISCHARGE INSTRUCTIONS (ENGLISH)            TIME SPENT ON DISCHARGE:20 minutes       [1]   Patient Active Problem List  Diagnosis    IUFD at 20 weeks or more of gestation    20 weeks gestation of pregnancy    History of IUFD

## 2025-05-12 LAB
COAGULATION SPECIALIST REVIEW: NORMAL
F5 P.R506Q BLD/T QL: NEGATIVE
PROVIDER SIGNING NAME: NORMAL

## 2025-05-13 LAB
BEAKER SEE SCANNED REPORT: NORMAL
DRVV CONF RATIO (OHS): 0.99
DRVV NORM RATIO (OHS): 1.28 (ref 0–1.19)
DRVV SCR RATIO (OHS): 1.27
L.A. PATH INTERP (OHS): ABNORMAL
LA ST CALC (OHS): 4.6 SECONDS (ref 0–7.9)

## 2025-05-16 LAB — MAYO GENERIC ORDERABLE RESULT: NORMAL

## 2025-05-21 ENCOUNTER — RESULTS FOLLOW-UP (OUTPATIENT)
Dept: OBSTETRICS AND GYNECOLOGY | Facility: CLINIC | Age: 25
End: 2025-05-21

## 2025-07-01 ENCOUNTER — TELEPHONE (OUTPATIENT)
Dept: OBSTETRICS AND GYNECOLOGY | Facility: CLINIC | Age: 25
End: 2025-07-01
Payer: MEDICAID